# Patient Record
Sex: MALE | Race: BLACK OR AFRICAN AMERICAN | Employment: FULL TIME | URBAN - METROPOLITAN AREA
[De-identification: names, ages, dates, MRNs, and addresses within clinical notes are randomized per-mention and may not be internally consistent; named-entity substitution may affect disease eponyms.]

---

## 2018-02-19 ENCOUNTER — OFFICE VISIT (OUTPATIENT)
Dept: FAMILY MEDICINE CLINIC | Age: 43
End: 2018-02-19
Payer: COMMERCIAL

## 2018-02-19 VITALS
BODY MASS INDEX: 30.3 KG/M2 | SYSTOLIC BLOOD PRESSURE: 161 MMHG | DIASTOLIC BLOOD PRESSURE: 90 MMHG | WEIGHT: 256.6 LBS | HEART RATE: 71 BPM | HEIGHT: 77 IN

## 2018-02-19 DIAGNOSIS — I10 ESSENTIAL HYPERTENSION: Primary | ICD-10-CM

## 2018-02-19 DIAGNOSIS — G89.29 CHRONIC PAIN OF LEFT KNEE: ICD-10-CM

## 2018-02-19 DIAGNOSIS — Z72.0 TOBACCO ABUSE: ICD-10-CM

## 2018-02-19 DIAGNOSIS — Z13.220 SCREENING FOR LIPID DISORDERS: ICD-10-CM

## 2018-02-19 DIAGNOSIS — Z76.89 ENCOUNTER TO ESTABLISH CARE: ICD-10-CM

## 2018-02-19 DIAGNOSIS — Z11.4 SCREENING FOR HIV (HUMAN IMMUNODEFICIENCY VIRUS): ICD-10-CM

## 2018-02-19 DIAGNOSIS — M25.562 CHRONIC PAIN OF LEFT KNEE: ICD-10-CM

## 2018-02-19 DIAGNOSIS — Z13.1 SCREENING FOR DIABETES MELLITUS: ICD-10-CM

## 2018-02-19 PROCEDURE — 99204 OFFICE O/P NEW MOD 45 MIN: CPT | Performed by: NURSE PRACTITIONER

## 2018-02-19 RX ORDER — AMLODIPINE BESYLATE 5 MG/1
5 TABLET ORAL DAILY
Qty: 30 TABLET | Refills: 3 | Status: SHIPPED | OUTPATIENT
Start: 2018-02-19 | End: 2019-04-08

## 2018-02-19 RX ORDER — HYDROCHLOROTHIAZIDE 25 MG/1
12.5 TABLET ORAL DAILY
Qty: 30 TABLET | Refills: 3 | Status: SHIPPED | OUTPATIENT
Start: 2018-02-19 | End: 2019-04-08

## 2018-02-19 ASSESSMENT — PATIENT HEALTH QUESTIONNAIRE - PHQ9
2. FEELING DOWN, DEPRESSED OR HOPELESS: 2
4. FEELING TIRED OR HAVING LITTLE ENERGY: 1
SUM OF ALL RESPONSES TO PHQ9 QUESTIONS 1 & 2: 4
6. FEELING BAD ABOUT YOURSELF - OR THAT YOU ARE A FAILURE OR HAVE LET YOURSELF OR YOUR FAMILY DOWN: 0
1. LITTLE INTEREST OR PLEASURE IN DOING THINGS: 2
10. IF YOU CHECKED OFF ANY PROBLEMS, HOW DIFFICULT HAVE THESE PROBLEMS MADE IT FOR YOU TO DO YOUR WORK, TAKE CARE OF THINGS AT HOME, OR GET ALONG WITH OTHER PEOPLE: 0
9. THOUGHTS THAT YOU WOULD BE BETTER OFF DEAD, OR OF HURTING YOURSELF: 0
8. MOVING OR SPEAKING SO SLOWLY THAT OTHER PEOPLE COULD HAVE NOTICED. OR THE OPPOSITE, BEING SO FIGETY OR RESTLESS THAT YOU HAVE BEEN MOVING AROUND A LOT MORE THAN USUAL: 0
SUM OF ALL RESPONSES TO PHQ QUESTIONS 1-9: 6
5. POOR APPETITE OR OVEREATING: 0
3. TROUBLE FALLING OR STAYING ASLEEP: 1
7. TROUBLE CONCENTRATING ON THINGS, SUCH AS READING THE NEWSPAPER OR WATCHING TELEVISION: 0

## 2018-02-19 ASSESSMENT — ENCOUNTER SYMPTOMS
SHORTNESS OF BREATH: 0
CONSTIPATION: 0
DIARRHEA: 0
CHEST TIGHTNESS: 0
TROUBLE SWALLOWING: 0
NAUSEA: 0
VOMITING: 0

## 2018-02-19 NOTE — PROGRESS NOTES
(left knee pain). Neurological: Positive for dizziness (intermittent). Negative for syncope, light-headedness and headaches. Objective:   BP (!) 161/90 (Site: Left Arm, Position: Sitting, Cuff Size: Large Adult)   Pulse 71   Ht 6' 5\" (1.956 m)   Wt 256 lb 9.6 oz (116.4 kg)   BMI 30.43 kg/m²     Physical Exam   Constitutional: He is oriented to person, place, and time. He appears well-developed and well-nourished. No distress. HENT:   Head: Normocephalic. Eyes: Conjunctivae are normal.   Neck: Normal range of motion. Cardiovascular: Normal rate, regular rhythm and normal heart sounds. Pulmonary/Chest: Effort normal and breath sounds normal.   Abdominal: Soft. Bowel sounds are normal.   Musculoskeletal:        Left knee: He exhibits deformity (with movement). He exhibits normal range of motion, no swelling, no effusion, no LCL laxity, normal patellar mobility, no bony tenderness and no MCL laxity. No tenderness found. No medial joint line, no lateral joint line and no patellar tendon tenderness noted. Neurological: He is alert and oriented to person, place, and time. No cranial nerve deficit. GCS eye subscore is 4. GCS verbal subscore is 5. GCS motor subscore is 6. Skin: Skin is warm, dry and intact. He is not diaphoretic. Psychiatric: He has a normal mood and affect. His speech is normal.   Vitals reviewed. Assessment & Plan:      1. Essential hypertension  Discussed importance of treatment for HTN   Will initiate treatment with 12.5 HCTZ and amlodipine 5mg with room to titrate dosage up. - hydrochlorothiazide (HYDRODIURIL) 25 MG tablet; Take 0.5 tablets by mouth daily  Dispense: 30 tablet; Refill: 3  - amLODIPine (NORVASC) 5 MG tablet; Take 1 tablet by mouth daily  Dispense: 30 tablet; Refill: 3  - Comprehensive Metabolic Panel; Future    2. Chronic pain of left knee  Will obtain imaging  Declines referral to orthopedic surgery  - XR KNEE LEFT (3 VIEWS); Future    3.  Encounter to establish care  - I generally recommend that people of all ages try to get 150 minutes of physical activity per week and it doesnt matter how this totals up, in other words 30 minutes 5 days per week is as good as 50 minutes 3 days a week and so on. The level of activity should be such that it is able to get your heart rate up to 100 or more, for example a brisk walk should achieve this rate. - In terms of diet, I generally recommend trying to avoid processed foods wherever possible (anything that comes in a can or a box) which can be achieved by sticking to the outside walls of the grocery store where generally you will find fresh fruits/vegetables, meats, dairy, and frozen foods.    - Try to avoid starches in the diet where possible and minimize bread, rice, potatoes, and pasta in the diet. Specifically try to avoid gluten, which even in people that dont have a clara allergy, causes havoc in the small intestine and alters absorption of nutrients which can in turn lead to obesity.   - Try to achieve a regular sleep schedule, waking and laying down at the same time each night. Most people need 7 hours per night plus or minus 2 hours. You will know that youre getting enough because you will wake feeling refreshed and not need to sleep in to catch up on weekends. - Make sure that you dont neglect your skin, I recommend an SPF 30 or higher sun screen any time that you plan to be in the sun for more than 20 minutes, even in the winter or on cloudy days (keep in mind that UV light penetrates clouds and can cause burns even on cloudy days). - Finally, make sure that you always have something to look forward to whether this is a vacation, a special event, or just a weekend off work. Having something to look forward to helps to maintain positive focus and is good for mental health.      4. Screening for diabetes mellitus  Health maintanence  - Hemoglobin A1C; Future  - Comprehensive Metabolic Panel;

## 2018-02-19 NOTE — PATIENT INSTRUCTIONS
link.  Current as of: March 20, 2017  Content Version: 11.5  © 8863-1607 [employer]. Care instructions adapted under license by [facility] ([facility]). If you have questions about a medical condition or this instruction, always ask your healthcare professional. [address] any warranty or liability for your use of this information. High Blood Pressure: Care Instructions  Your Care Instructions    If your blood pressure is usually above 140/90, you have high blood pressure, or hypertension. That means the top number is 140 or higher or the bottom number is 90 or higher, or both. Despite what a lot of people think, high blood pressure usually doesn't cause headaches or make you feel dizzy or lightheaded. It usually has no symptoms. But it does increase your risk for heart attack, stroke, and kidney or eye damage. The higher your blood pressure, the more your risk increases. Your doctor will give you a goal for your blood pressure. Your goal will be based on your health and your age. An example of a goal is to keep your blood pressure below 140/90. Lifestyle changes, such as eating healthy and being active, are always important to help lower blood pressure. You might also take medicine to reach your blood pressure goal.  Follow-up care is a key part of your treatment and safety. Be sure to make and go to all appointments, and call your doctor if you are having problems. It's also a good idea to know your test results and keep a list of the medicines you take. How can you care for yourself at home? Medical treatment  · If you stop taking your medicine, your blood pressure will go back up. You may take one or more types of medicine to lower your blood pressure. Be safe with medicines. Take your medicine exactly as prescribed. Call your doctor if you think you are having a problem with your medicine. · Talk to your doctor before you start taking aspirin every day.  Aspirin can help certain people lower their risk of a heart attack or stroke. But taking aspirin isn't right for everyone, because it can cause serious bleeding. · See your doctor regularly. You may need to see the doctor more often at first or until your blood pressure comes down. · If you are taking blood pressure medicine, talk to your doctor before you take decongestants or anti-inflammatory medicine, such as ibuprofen. Some of these medicines can raise blood pressure. · Learn how to check your blood pressure at home. Lifestyle changes  · Stay at a healthy weight. This is especially important if you put on weight around the waist. Losing even 10 pounds can help you lower your blood pressure. · If your doctor recommends it, get more exercise. Walking is a good choice. Bit by bit, increase the amount you walk every day. Try for at least 30 minutes on most days of the week. You also may want to swim, bike, or do other activities. · Avoid or limit alcohol. Talk to your doctor about whether you can drink any alcohol. · Try to limit how much sodium you eat to less than 2,300 milligrams (mg) a day. Your doctor may ask you to try to eat less than 1,500 mg a day. · Eat plenty of fruits (such as bananas and oranges), vegetables, legumes, whole grains, and low-fat dairy products. · Lower the amount of saturated fat in your diet. Saturated fat is found in animal products such as milk, cheese, and meat. Limiting these foods may help you lose weight and also lower your risk for heart disease. · Do not smoke. Smoking increases your risk for heart attack and stroke. If you need help quitting, talk to your doctor about stop-smoking programs and medicines. These can increase your chances of quitting for good. When should you call for help? Call 911 anytime you think you may need emergency care. This may mean having symptoms that suggest that your blood pressure is causing a serious heart or blood vessel problem.  Your blood pressure may be over 180/110. ? For example, call 911 if:  ? · You have symptoms of a heart attack. These may include:  ¨ Chest pain or pressure, or a strange feeling in the chest.  ¨ Sweating. ¨ Shortness of breath. ¨ Nausea or vomiting. ¨ Pain, pressure, or a strange feeling in the back, neck, jaw, or upper belly or in one or both shoulders or arms. ¨ Lightheadedness or sudden weakness. ¨ A fast or irregular heartbeat. ? · You have symptoms of a stroke. These may include:  ¨ Sudden numbness, tingling, weakness, or loss of movement in your face, arm, or leg, especially on only one side of your body. ¨ Sudden vision changes. ¨ Sudden trouble speaking. ¨ Sudden confusion or trouble understanding simple statements. ¨ Sudden problems with walking or balance. ¨ A sudden, severe headache that is different from past headaches. ? · You have severe back or belly pain. ?Do not wait until your blood pressure comes down on its own. Get help right away. ?Call your doctor now or seek immediate care if:  ? · Your blood pressure is much higher than normal (such as 180/110 or higher), but you don't have symptoms. ? · You think high blood pressure is causing symptoms, such as:  ¨ Severe headache. ¨ Blurry vision. ? Watch closely for changes in your health, and be sure to contact your doctor if:  ? · Your blood pressure measures 140/90 or higher at least 2 times. That means the top number is 140 or higher or the bottom number is 90 or higher, or both. ? · You think you may be having side effects from your blood pressure medicine. ? · Your blood pressure is usually normal, but it goes above normal at least 2 times. Where can you learn more? Go to [email or web address] [email or web address] org and sign in to your [employer] account. Enter C860 in the [employer] box to learn more about \"High Blood Pressure: Care Instructions. \"     If you do not have an account, please click on the \"Sign Up Now\" link.   Current as of: Brie 10, 2017  Content Version: 11.5  © 1893-7900 [employer], [employer]. Care instructions adapted under license by [facility] ([facility]). If you have questions about a medical condition or this instruction, always ask your healthcare professional. [address] any warranty or liability for your use of this information.     - I generally recommend that people of all ages try to get 150 minutes of physical activity per week and it doesnt matter how this totals up, in other words 30 minutes 5 days per week is as good as 50 minutes 3 days a week and so on. The level of activity should be such that it is able to get your heart rate up to 100 or more, for example a brisk walk should achieve this rate. - In terms of diet, I generally recommend trying to avoid processed foods wherever possible (anything that comes in a can or a box) which can be achieved by sticking to the outside walls of the grocery store where generally you will find fresh fruits/vegetables, meats, dairy, and frozen foods.    - Try to avoid starches in the diet where possible and minimize bread, rice, potatoes, and pasta in the diet. Specifically try to avoid gluten, which even in people that dont have a clara allergy, causes havoc in the small intestine and alters absorption of nutrients which can in turn lead to obesity.   - Try to achieve a regular sleep schedule, waking and laying down at the same time each night. Most people need 7 hours per night plus or minus 2 hours. You will know that youre getting enough because you will wake feeling refreshed and not need to sleep in to catch up on weekends. - Make sure that you dont neglect your skin, I recommend an SPF 30 or higher sun screen any time that you plan to be in the sun for more than 20 minutes, even in the winter or on cloudy days (keep in mind that UV light penetrates clouds and can cause burns even on cloudy days).    - Finally, make sure that you always have something to look forward to whether this is a vacation, a special event, or just a weekend off work. Having something to look forward to helps to maintain positive focus and is good for mental health. In order to reduce smoking try the following:  -Find a place for your cigarettes to [facility]" and make sure they always stay there and at the same time pick a separate place where you smoke them. The idea here is that you never want them to be easily accessible so that you actually have to get up and walk somewhere to get one if you want it.    -Try to keep something handy that you can keep in your mouth for instance a toothpick or better yet a bag of dum dum pops that you can keep in your mouth to take the place of the cigarette.    -third, before each cigarette drink about 6-8 ounces of water. There are two reasons to this, one is that dehydration causes nicotine cravings and the other is that because the average nicotine craving lasts about 2 minutes, in the time it takes to get the drink, you may not want the cigarette anymore.

## 2019-04-05 ENCOUNTER — HOSPITAL ENCOUNTER (EMERGENCY)
Age: 44
Discharge: HOME | End: 2019-04-06
Attending: EMERGENCY MEDICINE
Payer: COMMERCIAL

## 2019-04-05 DIAGNOSIS — M54.50 ACUTE RIGHT-SIDED LOW BACK PAIN WITHOUT SCIATICA: ICD-10-CM

## 2019-04-05 DIAGNOSIS — S39.012A STRAIN OF LUMBAR REGION, INITIAL ENCOUNTER: Primary | ICD-10-CM

## 2019-04-05 PROCEDURE — 99282 EMERGENCY DEPT VISIT SF MDM: CPT

## 2019-04-05 PROCEDURE — 6370000000 HC RX 637 (ALT 250 FOR IP): Performed by: EMERGENCY MEDICINE

## 2019-04-05 PROCEDURE — 6360000002 HC RX W HCPCS: Performed by: EMERGENCY MEDICINE

## 2019-04-05 RX ORDER — HYDROCODONE BITARTRATE AND ACETAMINOPHEN 5; 325 MG/1; MG/1
1 TABLET ORAL ONCE
Status: COMPLETED | OUTPATIENT
Start: 2019-04-06 | End: 2019-04-05

## 2019-04-05 RX ORDER — CYCLOBENZAPRINE HCL 10 MG
10 TABLET ORAL 3 TIMES DAILY PRN
COMMUNITY
End: 2019-04-29

## 2019-04-05 RX ORDER — IBUPROFEN 800 MG/1
800 TABLET ORAL EVERY 6 HOURS PRN
COMMUNITY
End: 2019-04-08

## 2019-04-05 RX ORDER — DEXAMETHASONE 4 MG/1
6 TABLET ORAL ONCE
Status: COMPLETED | OUTPATIENT
Start: 2019-04-06 | End: 2019-04-05

## 2019-04-05 RX ORDER — DIAZEPAM 5 MG/1
5 TABLET ORAL ONCE
Status: COMPLETED | OUTPATIENT
Start: 2019-04-06 | End: 2019-04-06

## 2019-04-05 RX ADMIN — HYDROCODONE BITARTRATE AND ACETAMINOPHEN 1 TABLET: 5; 325 TABLET ORAL at 23:59

## 2019-04-05 RX ADMIN — DEXAMETHASONE 6 MG: 4 TABLET ORAL at 23:59

## 2019-04-05 ASSESSMENT — PAIN SCALES - GENERAL
PAINLEVEL_OUTOF10: 10
PAINLEVEL_OUTOF10: 10

## 2019-04-06 VITALS
HEART RATE: 79 BPM | WEIGHT: 250 LBS | SYSTOLIC BLOOD PRESSURE: 159 MMHG | RESPIRATION RATE: 14 BRPM | OXYGEN SATURATION: 100 % | HEIGHT: 77 IN | BODY MASS INDEX: 29.52 KG/M2 | DIASTOLIC BLOOD PRESSURE: 99 MMHG | TEMPERATURE: 97.7 F

## 2019-04-06 PROCEDURE — 6370000000 HC RX 637 (ALT 250 FOR IP): Performed by: EMERGENCY MEDICINE

## 2019-04-06 RX ADMIN — DIAZEPAM 5 MG: 5 TABLET ORAL at 00:00

## 2019-04-06 ASSESSMENT — ENCOUNTER SYMPTOMS
CHEST TIGHTNESS: 0
EYE REDNESS: 0
ABDOMINAL PAIN: 0
EYE DISCHARGE: 0
SHORTNESS OF BREATH: 0
FACIAL SWELLING: 0
BACK PAIN: 1
ABDOMINAL DISTENTION: 0

## 2019-04-06 NOTE — ED PROVIDER NOTES
[facility] ED  eMERGENCY dEPARTMENT eNCOUnter      Pt Name: Frandy Rdz  MRN: [id number]  [place] 1975  Date of evaluation: 4/5/2019  Provider: Joe Gamboa       CHIEF COMPLAINT       Chief Complaint   Patient presents with    Back Pain         HISTORY OF PRESENT ILLNESS  (Location/Symptom, Timing/Onset, Context/Setting, Quality, Duration, Modifying Factors, Severity.)   Frandy Rdz is a 37 y.o. male who presents to the emergency department complaining of acute onset chronic back pain. The patient said over the last few weeks his low back pain has gotten worse. He went to work today and it seemed to aggravate it. His also having pain in his right upper thigh. The patient said is an aching pain when he walks and he has pain to the right lower back. He said it is like his normal back pain. He denies any paresthesias he denies any loss of bowel or bladder control. The patient did go to [facility] today he got Toradol and Norflex shot he said it did not help. Patient denies any trauma. States his pain is a 10 out of 10. The patient told me when he is sitting down the pain is improved the pain is aggravated when he gets up to walk. He denies any motor weakness. Nursing Notes werereviewed. REVIEW OF SYSTEMS    (2-9 systems for level 4, 10 or more for level 5)     Review of Systems   Constitutional: Negative for chills and fatigue. HENT: Negative for facial swelling. Eyes: Negative for discharge and redness. Respiratory: Negative for chest tightness and shortness of breath. Cardiovascular: Negative for chest pain and palpitations. Gastrointestinal: Negative for abdominal distention and abdominal pain. Genitourinary: Negative for dysuria and hematuria. Musculoskeletal: Positive for back pain. Negative for gait problem, joint swelling and neck pain. Skin: Negative for pallor and rash. Neurological: Negative for speech difficulty and headaches.    All other systems reviewed and are negative. Except as noted above the remainder of the review of systems was reviewed and negative. PAST MEDICAL HISTORY     Past Medical History:   Diagnosis Date    Hypertension          SURGICAL HISTORY     History reviewed. No pertinent surgical history. CURRENT MEDICATIONS       Previous Medications    AMLODIPINE (NORVASC) 5 MG TABLET    Take 1 tablet by mouth daily    CYCLOBENZAPRINE (FLEXERIL) 10 MG TABLET    Take 10 mg by mouth 3 times daily as needed for Muscle spasms    HYDROCHLOROTHIAZIDE (HYDRODIURIL) 25 MG TABLET    Take 0.5 tablets by mouth daily    IBUPROFEN (ADVIL;MOTRIN) 800 MG TABLET    Take 800 mg by mouth every 6 hours as needed for Pain       ALLERGIES     Patient has no known allergies. FAMILY HISTORY     History reviewed. No pertinent family history. SOCIAL HISTORY       Social History     Socioeconomic History    Marital status: Single     Spouse name: None    Number of children: None    Years of education: None    Highest education level: None   Occupational History    None   Social Needs    Financial resource strain: None    Food insecurity:     Worry: None     Inability: None    Transportation needs:     Medical: None     Non-medical: None   Tobacco Use    Smoking status: Heavy Tobacco Smoker    Smokeless tobacco: Never Used   Substance and Sexual Activity    Alcohol use:  Yes     Alcohol/week: 1.2 oz     Types: 2 Cans of beer per week     Comment: nightly    Drug use: No    Sexual activity: None   Lifestyle    Physical activity:     Days per week: None     Minutes per session: None    Stress: None   Relationships    Social connections:     Talks on phone: None     Gets together: None     Attends Holiness service: None     Active member of club or organization: None     Attends meetings of clubs or organizations: None     Relationship status: None    Intimate partner violence:     Fear of current or ex partner: None Emotionally abused: None     Physically abused: None     Forced sexual activity: None   Other Topics Concern    None   Social History Narrative    None         PHYSICAL EXAM    (up to 7 for level 4, 8 or more for level 5)      ED Triage Vitals [04/05/19 2328]   BP Temp Temp Source Pulse Resp SpO2 Height Weight   -- 97.7 °F (36.5 °C) Oral 79 14 100 % 6' 5\" (1.956 m) 250 lb (113.4 kg)       Physical Exam   Constitutional: He is oriented to person, place, and time. He appears well-nourished. HENT:   Head: Normocephalic. Eyes: Conjunctivae are normal.   Neck: Normal range of motion. Neck supple. Cardiovascular: Normal rate and regular rhythm. Pulmonary/Chest: Effort normal and breath sounds normal.   Musculoskeletal: Normal range of motion. He exhibits no edema. Lumbar back: He exhibits tenderness, pain and spasm. He exhibits no bony tenderness. Back:         Legs:  Neurological: He is alert and oriented to person, place, and time. He has normal strength. No sensory deficit. Skin: Skin is warm and dry. Nursing note and vitals reviewed. DIAGNOSTIC RESULTS     EKG: All EKG's are interpreted by the Emergency Department Physician who either signs or Co-signs this chart in the absence of a cardiologist.         RADIOLOGY:   Non-plain film images such as CT, Ultrasound and MRI are read by the radiologist. Plain radiographic images arevisualized and preliminarily interpreted by the emergency physician with the below findings:    No results found. Interpretation per the Radiologist below, if available at the time of this note:    No orders to display         ED BEDSIDE ULTRASOUND:   Performed by ED Physician - none    LABS:  Labs Reviewed - No data to display    All other labs were within normal range or not returned as of this dictation.     EMERGENCY DEPARTMENT COURSE and DIFFERENTIALDIAGNOSIS/MDM:   Vitals:    Vitals:    04/05/19 2328   Pulse: 79   Resp: 14   Temp: 97.7 °F (36.5 °C) TempSrc: Oral   SpO2: 100%   Weight: 113.4 kg (250 lb)   Height: 6' 5\" (1.956 m)            RE-EVALUATION:    The patient has some relief he was given Valium one Norco and Decadron. Patient already has a prescription for Flexeril and Motrin. He agrees with the plan is comfortable going home. He is told to return if any changes of his symptoms. The patient presents with low back pain without signs of spinal cord compression, cauda equina syndrome, infection, aneurysm or other serious etiology. The patient is neurologically intact. Given the extremely low risk of these diagnoses further testing and evaluation for these possibilities does not appear to be indicated at this time. The patient has been instructed to return if the symptoms worsen or change in any way. I have reviewed the disposition diagnosis with the patient and or their family/guardian. I have answered their questions and given discharge instructions. They voiced understanding of these instructions and did not have any further questions or complaints. CONSULTS:  None    PROCEDURES:  None    FINAL IMPRESSION      1. Strain of lumbar region, initial encounter    2. Acute right-sided low back pain without sciatica          DISPOSITION/PLAN   DISPOSITION Decision To Discharge 04/06/2019 12:44:29 AM      CONDITION ON DISPOSITION:  stable    PATIENT REFERRED TO:  KALE Burns - [place] [address]  [phone]    Schedule an appointment as soon as possible for a visit in 2 days        DISCHARGE MEDICATIONS:  [unfilled]    (Please note that portions of thisnote were completed with a voice recognition program.  Efforts were made to edit the dictations but occasionally words are mis-transcribed.)    Lorin Us D.O., F.A.CCLARICE.   Attending [address] [place] [address],[address], DO  04/06/19 0045

## 2019-04-08 ENCOUNTER — HOSPITAL ENCOUNTER (EMERGENCY)
Age: 44
Discharge: HOME | End: 2019-04-08
Attending: EMERGENCY MEDICINE
Payer: COMMERCIAL

## 2019-04-08 ENCOUNTER — HOSPITAL ENCOUNTER (OUTPATIENT)
Dept: GENERAL RADIOLOGY | Facility: CLINIC | Age: 44
Discharge: HOME | End: 2019-04-10
Payer: COMMERCIAL

## 2019-04-08 ENCOUNTER — HOSPITAL ENCOUNTER (OUTPATIENT)
Facility: CLINIC | Age: 44
Discharge: HOME | End: 2019-04-10
Payer: COMMERCIAL

## 2019-04-08 VITALS
DIASTOLIC BLOOD PRESSURE: 96 MMHG | BODY MASS INDEX: 29.52 KG/M2 | TEMPERATURE: 98.6 F | SYSTOLIC BLOOD PRESSURE: 158 MMHG | HEIGHT: 77 IN | WEIGHT: 250 LBS | OXYGEN SATURATION: 97 % | RESPIRATION RATE: 16 BRPM | HEART RATE: 73 BPM

## 2019-04-08 DIAGNOSIS — M54.31 SCIATICA OF RIGHT SIDE: Primary | ICD-10-CM

## 2019-04-08 DIAGNOSIS — M54.6 ACUTE RIGHT-SIDED THORACIC BACK PAIN: ICD-10-CM

## 2019-04-08 PROCEDURE — 6360000002 HC RX W HCPCS: Performed by: EMERGENCY MEDICINE

## 2019-04-08 PROCEDURE — 6370000000 HC RX 637 (ALT 250 FOR IP): Performed by: EMERGENCY MEDICINE

## 2019-04-08 PROCEDURE — 99283 EMERGENCY DEPT VISIT LOW MDM: CPT

## 2019-04-08 PROCEDURE — 72072 X-RAY EXAM THORAC SPINE 3VWS: CPT

## 2019-04-08 RX ORDER — PREDNISONE 10 MG/1
TABLET ORAL
Qty: 20 TABLET | Refills: 0 | Status: SHIPPED | OUTPATIENT
Start: 2019-04-08 | End: 2019-04-08

## 2019-04-08 RX ORDER — IBUPROFEN 800 MG/1
800 TABLET ORAL EVERY 8 HOURS PRN
Qty: 30 TABLET | Refills: 0 | Status: SHIPPED | OUTPATIENT
Start: 2019-04-08 | End: 2021-02-10

## 2019-04-08 RX ORDER — ORPHENADRINE CITRATE 100 MG/1
100 TABLET, EXTENDED RELEASE ORAL 2 TIMES DAILY
Qty: 20 TABLET | Refills: 0 | Status: SHIPPED | OUTPATIENT
Start: 2019-04-08 | End: 2019-04-08

## 2019-04-08 RX ORDER — METHYLPREDNISOLONE SODIUM SUCCINATE 125 MG/2ML
125 INJECTION, POWDER, LYOPHILIZED, FOR SOLUTION INTRAMUSCULAR; INTRAVENOUS ONCE
Status: COMPLETED | OUTPATIENT
Start: 2019-04-08 | End: 2019-04-08

## 2019-04-08 RX ORDER — KETOROLAC TROMETHAMINE 30 MG/ML
60 INJECTION, SOLUTION INTRAMUSCULAR; INTRAVENOUS ONCE
Status: COMPLETED | OUTPATIENT
Start: 2019-04-08 | End: 2019-04-08

## 2019-04-08 RX ORDER — DIAZEPAM 5 MG/1
5 TABLET ORAL ONCE
Status: COMPLETED | OUTPATIENT
Start: 2019-04-08 | End: 2019-04-08

## 2019-04-08 RX ADMIN — DIAZEPAM 5 MG: 5 TABLET ORAL at 03:30

## 2019-04-08 RX ADMIN — METHYLPREDNISOLONE SODIUM SUCCINATE 125 MG: 125 INJECTION, POWDER, FOR SOLUTION INTRAMUSCULAR; INTRAVENOUS at 03:31

## 2019-04-08 RX ADMIN — KETOROLAC TROMETHAMINE 60 MG: 30 INJECTION, SOLUTION INTRAMUSCULAR at 03:32

## 2019-04-08 ASSESSMENT — PAIN SCALES - GENERAL: PAINLEVEL_OUTOF10: 10

## 2019-04-08 ASSESSMENT — ENCOUNTER SYMPTOMS
DIARRHEA: 0
SHORTNESS OF BREATH: 0
BACK PAIN: 1
SORE THROAT: 0
NAUSEA: 0
VOMITING: 0
ABDOMINAL PAIN: 0

## 2019-04-08 NOTE — ED PROVIDER NOTES
[facility]  [address] [place] [address]  Phone: [phone]  Fax: [phone]    eMERGENCY dEPARTMENT eNCOUnter          Pt Name: Russ Quinonez  MRN: [id number]  [place] 1975  Date of evaluation: 4/8/2019      CHIEF COMPLAINT       Chief Complaint   Patient presents with    Back Pain    Leg Pain     right       HISTORY OF PRESENT ILLNESS              Russ Quinonez is a 37 y.o. male who presents with recurrent right low back pain radiating to his leg. History of similar pain in the past.  States it flared up on him a few days ago. States he is going to call his PCP first thing this morning. Medications did help when he was here a few days ago. Nothing otherwise makes it better or worse. Denies any neurologic symptoms such as numbness/weakness/paresthesias. No bowel or bladder retention/incontinence. Denies any recent trauma. States he does do a fair amount of heavy lifting daily. Denies any fevers. No IV drug use. Denies any other symptoms or concerns. REVIEW OF SYSTEMS         Review of Systems   Constitutional: Negative for chills and fever. HENT: Negative for sore throat. Respiratory: Negative for shortness of breath. Cardiovascular: Negative for chest pain. Gastrointestinal: Negative for abdominal pain, diarrhea, nausea and vomiting. Musculoskeletal: Positive for back pain. Negative for neck pain. Skin: Negative for rash. Neurological: Negative for weakness and numbness. All other systems reviewed and are negative. PAST MEDICAL HISTORY    has a past medical history of Hypertension. SURGICAL HISTORY      has no past surgical history on file.     CURRENT MEDICATIONS       Discharge Medication List as of 4/8/2019  3:31 AM      CONTINUE these medications which have NOT CHANGED    Details   cyclobenzaprine (FLEXERIL) 10 MG tablet Take 10 mg by mouth 3 times daily as needed for Muscle spasmsHistorical Med Take 1 tablet by mouth every 8 hours as needed for Pain     Dispense:  30 tablet     Refill:  0        Vitals:    Vitals:    04/08/19 0317 04/08/19 0322 04/08/19 0330   BP:  (!) 158/96    Pulse: 73     Resp:   16   Temp: 98.6 °F (37 °C)     TempSrc: Oral     SpO2: 97%     Weight: 250 lb (113.4 kg)     Height: 6' 5\" (1.956 m)       -------------------------  BP: (!) 158/96, Temp: 98.6 °F (37 °C), Pulse: 73, Resp: 16      Re-evaluation Notes    Patient is feeling better on reevaluation. No acute changes. Remains neurologically intact. Appropriate for discharge and outpatient follow-up. I have reviewed the disposition diagnosis with the patient and or their family/guardian. I have answered their questions and given discharge instructions. They voiced understanding of these instructions and did not have any further questions or complaints. CONSULTS:    None    CRITICAL CARE:     None    PROCEDURES:    None    FINAL IMPRESSION      1.  Sciatica of right side          DISPOSITION/PLAN   DISPOSITION Decision To Discharge 04/08/2019 03:36:01 AM      Condition on Disposition    Improved    PATIENT REFERRED TO:  KALE Shane [address] [place]  [address]  [phone]    Schedule an appointment as soon as possible for a visit today      [facility]  [phone]    Schedule an appointment as soon as possible for a visit in 2 days        DISCHARGE MEDICATIONS:  Discharge Medication List as of 4/8/2019  3:31 AM      START taking these medications    Details   predniSONE (DELTASONE) 10 MG tablet Take 4 tablets by mouth once daily for 5 days, Disp-20 tablet, R-0Print      orphenadrine (NORFLEX) 100 MG extended release tablet Take 1 tablet by mouth 2 times daily for 10 days, Disp-20 tablet, R-0Print             (Please note that portions of this note were completed with a voice recognition program.  Efforts were made to edit the dictations but occasionally words are mis-transcribed.)    Mauricio Manuel,   Attending Emergency Physician        Mauricio Manuel,   04/08/19 2137

## 2019-04-09 ENCOUNTER — HOSPITAL ENCOUNTER (OUTPATIENT)
Dept: MRI IMAGING | Age: 44
Discharge: HOME | End: 2019-04-11
Payer: COMMERCIAL

## 2019-04-09 DIAGNOSIS — M54.6 ACUTE RIGHT-SIDED THORACIC BACK PAIN: ICD-10-CM

## 2019-04-09 DIAGNOSIS — R20.2 PARESTHESIA: ICD-10-CM

## 2019-04-09 DIAGNOSIS — R22.2 LOCALIZED SWELLING OF BACK: ICD-10-CM

## 2019-04-09 DIAGNOSIS — R29.898 WEAKNESS OF RIGHT LOWER EXTREMITY: ICD-10-CM

## 2019-04-11 ENCOUNTER — HOSPITAL ENCOUNTER (OUTPATIENT)
Age: 44
Discharge: HOME | End: 2019-04-13
Payer: COMMERCIAL

## 2019-04-11 ENCOUNTER — OFFICE VISIT (OUTPATIENT)
Dept: ORTHOPEDIC SURGERY | Age: 44
End: 2019-04-11
Payer: COMMERCIAL

## 2019-04-11 ENCOUNTER — HOSPITAL ENCOUNTER (OUTPATIENT)
Dept: GENERAL RADIOLOGY | Age: 44
Discharge: HOME | End: 2019-04-13
Payer: COMMERCIAL

## 2019-04-11 DIAGNOSIS — M25.551 RIGHT HIP PAIN: ICD-10-CM

## 2019-04-11 DIAGNOSIS — M54.10 RADICULAR PAIN OF RIGHT LOWER EXTREMITY: ICD-10-CM

## 2019-04-11 DIAGNOSIS — M54.5 ACUTE LOW BACK PAIN, UNSPECIFIED BACK PAIN LATERALITY, WITH SCIATICA PRESENCE UNSPECIFIED: Primary | ICD-10-CM

## 2019-04-11 DIAGNOSIS — M54.5 ACUTE LOW BACK PAIN, UNSPECIFIED BACK PAIN LATERALITY, WITH SCIATICA PRESENCE UNSPECIFIED: ICD-10-CM

## 2019-04-11 PROCEDURE — 99203 OFFICE O/P NEW LOW 30 MIN: CPT | Performed by: ORTHOPAEDIC SURGERY

## 2019-04-11 PROCEDURE — 72100 X-RAY EXAM L-S SPINE 2/3 VWS: CPT

## 2019-04-11 ASSESSMENT — ENCOUNTER SYMPTOMS
CONSTIPATION: 1
BACK PAIN: 1

## 2019-04-11 NOTE — PROGRESS NOTES
firmness    Examination right hip reveal some groin pain with hip range of motion    Patient reports symptoms of constipation    Diagnostic x-rays AP and lateral lumbar spine patient with postural list to the left otherwise normal  Assessment:      Encounter Diagnoses   Name Primary?     Acute low back pain, unspecified back pain laterality, with sciatica presence unspecified Yes    Radicular pain of right lower extremity     Right hip pain      Patient was unable to tolerate standard MRI      Plan:      MRI with sedation at [facility]    Refill ibuprofen and muscle relaxer        Paco Paula MD

## 2019-04-12 ENCOUNTER — HOSPITAL ENCOUNTER (EMERGENCY)
Age: 44
Discharge: HOME | End: 2019-04-12
Attending: EMERGENCY MEDICINE
Payer: COMMERCIAL

## 2019-04-12 ENCOUNTER — APPOINTMENT (OUTPATIENT)
Dept: GENERAL RADIOLOGY | Age: 44
End: 2019-04-12
Payer: COMMERCIAL

## 2019-04-12 VITALS
HEIGHT: 77 IN | WEIGHT: 250 LBS | SYSTOLIC BLOOD PRESSURE: 156 MMHG | TEMPERATURE: 98.2 F | OXYGEN SATURATION: 96 % | BODY MASS INDEX: 29.52 KG/M2 | DIASTOLIC BLOOD PRESSURE: 88 MMHG | RESPIRATION RATE: 18 BRPM | HEART RATE: 103 BPM

## 2019-04-12 DIAGNOSIS — M79.89 LEG SWELLING: Primary | ICD-10-CM

## 2019-04-12 DIAGNOSIS — M54.41 ACUTE RIGHT-SIDED LOW BACK PAIN WITH RIGHT-SIDED SCIATICA: ICD-10-CM

## 2019-04-12 LAB
ABSOLUTE EOS #: 0.2 K/UL (ref 0–0.4)
ABSOLUTE IMMATURE GRANULOCYTE: ABNORMAL K/UL (ref 0–0.3)
ABSOLUTE LYMPH #: 3 K/UL (ref 1–4.8)
ABSOLUTE MONO #: 0.7 K/UL (ref 0.1–1.3)
ALBUMIN SERPL-MCNC: 4.6 G/DL (ref 3.5–5.2)
ALBUMIN/GLOBULIN RATIO: ABNORMAL (ref 1–2.5)
ALP BLD-CCNC: 65 U/L (ref 40–129)
ALT SERPL-CCNC: 46 U/L (ref 5–41)
ANION GAP SERPL CALCULATED.3IONS-SCNC: 12 MMOL/L (ref 9–17)
AST SERPL-CCNC: 24 U/L
BASOPHILS # BLD: 1 % (ref 0–2)
BASOPHILS ABSOLUTE: 0.1 K/UL (ref 0–0.2)
BILIRUB SERPL-MCNC: 0.3 MG/DL (ref 0.3–1.2)
BUN BLDV-MCNC: 14 MG/DL (ref 6–20)
BUN/CREAT BLD: ABNORMAL (ref 9–20)
CALCIUM SERPL-MCNC: 9.7 MG/DL (ref 8.6–10.4)
CHLORIDE BLD-SCNC: 101 MMOL/L (ref 98–107)
CO2: 26 MMOL/L (ref 20–31)
CREAT SERPL-MCNC: 1.01 MG/DL (ref 0.7–1.2)
DIFFERENTIAL TYPE: ABNORMAL
EOSINOPHILS RELATIVE PERCENT: 2 % (ref 0–4)
GFR AFRICAN AMERICAN: >60 ML/MIN
GFR NON-AFRICAN AMERICAN: >60 ML/MIN
GFR SERPL CREATININE-BSD FRML MDRD: ABNORMAL ML/MIN/{1.73_M2}
GFR SERPL CREATININE-BSD FRML MDRD: ABNORMAL ML/MIN/{1.73_M2}
GLUCOSE BLD-MCNC: 93 MG/DL (ref 70–99)
HCT VFR BLD CALC: 43.8 % (ref 41–53)
HEMOGLOBIN: 15.1 G/DL (ref 13.5–17.5)
IMMATURE GRANULOCYTES: ABNORMAL %
LYMPHOCYTES # BLD: 35 % (ref 24–44)
MCH RBC QN AUTO: 33.7 PG (ref 26–34)
MCHC RBC AUTO-ENTMCNC: 34.5 G/DL (ref 31–37)
MCV RBC AUTO: 97.8 FL (ref 80–100)
MONOCYTES # BLD: 8 % (ref 1–7)
NRBC AUTOMATED: ABNORMAL PER 100 WBC
PDW BLD-RTO: 13.5 % (ref 11.5–14.9)
PLATELET # BLD: 215 K/UL (ref 150–450)
PLATELET ESTIMATE: ABNORMAL
PMV BLD AUTO: 9.5 FL (ref 6–12)
POTASSIUM SERPL-SCNC: 3.9 MMOL/L (ref 3.7–5.3)
RBC # BLD: 4.48 M/UL (ref 4.5–5.9)
RBC # BLD: ABNORMAL 10*6/UL
SEG NEUTROPHILS: 54 % (ref 36–66)
SEGMENTED NEUTROPHILS ABSOLUTE COUNT: 4.7 K/UL (ref 1.3–9.1)
SODIUM BLD-SCNC: 139 MMOL/L (ref 135–144)
TOTAL PROTEIN: 8.2 G/DL (ref 6.4–8.3)
WBC # BLD: 8.7 K/UL (ref 3.5–11)
WBC # BLD: ABNORMAL 10*3/UL

## 2019-04-12 PROCEDURE — 99284 EMERGENCY DEPT VISIT MOD MDM: CPT

## 2019-04-12 PROCEDURE — 6360000002 HC RX W HCPCS: Performed by: EMERGENCY MEDICINE

## 2019-04-12 PROCEDURE — 36415 COLL VENOUS BLD VENIPUNCTURE: CPT

## 2019-04-12 PROCEDURE — 93971 EXTREMITY STUDY: CPT

## 2019-04-12 PROCEDURE — 80053 COMPREHEN METABOLIC PANEL: CPT

## 2019-04-12 PROCEDURE — 85025 COMPLETE CBC W/AUTO DIFF WBC: CPT

## 2019-04-12 PROCEDURE — 96372 THER/PROPH/DIAG INJ SC/IM: CPT

## 2019-04-12 PROCEDURE — 74022 RADEX COMPL AQT ABD SERIES: CPT

## 2019-04-12 RX ORDER — MORPHINE SULFATE 10 MG/ML
6 INJECTION, SOLUTION INTRAMUSCULAR; INTRAVENOUS ONCE
Status: COMPLETED | OUTPATIENT
Start: 2019-04-12 | End: 2019-04-12

## 2019-04-12 RX ADMIN — MORPHINE SULFATE 6 MG: 10 INJECTION INTRAVENOUS at 15:16

## 2019-04-12 ASSESSMENT — ENCOUNTER SYMPTOMS
NAUSEA: 0
DIARRHEA: 1
BACK PAIN: 1
COUGH: 0
VOMITING: 0
SHORTNESS OF BREATH: 0

## 2019-04-12 ASSESSMENT — PAIN DESCRIPTION - PAIN TYPE: TYPE: ACUTE PAIN

## 2019-04-12 ASSESSMENT — PAIN DESCRIPTION - LOCATION: LOCATION: BACK;LEG

## 2019-04-12 ASSESSMENT — PAIN SCALES - GENERAL: PAINLEVEL_OUTOF10: 10

## 2019-04-12 ASSESSMENT — PAIN DESCRIPTION - ORIENTATION: ORIENTATION: RIGHT

## 2019-04-12 NOTE — ED NOTES
Pt given instructions for follow-up and discharge. Pt verbalizes understanding. Pt is A&O x4, PWD, eupneic, and ambulatory with steady, even gait upon discharge.       Mercedes Sharma RN  04/12/19 6186

## 2019-04-12 NOTE — ED PROVIDER NOTES
[address] ED  eMERGENCY dEPARTMENT eNCOUnter      Pt Name: Candace Sewell  MRN: [id number]  [place] 1975  Date of evaluation: 4/12/19      CHIEF COMPLAINT       Chief Complaint   Patient presents with    Leg Swelling    Back Pain     HISTORY OF PRESENT ILLNESS   HPI 37 y.o. male presents with complaints of foot swelling and back pain and diarrhea. The patient states that for the last few weeks he has been having right-sided low back pain radiating down to his leg. He is followed with orthopedic surgery, the pain is severe in intensity, worsened with movements. He's been taking Norco Flexeril and Aleve. The patient had an x-ray of his back yesterday that showed no evidence of fracture or lytic bone lesions. His orthopedic surgeon has ordered him an MRI to be performed under anesthesia. He is had some mild relief with his pain medications. He is also taking stool softeners with the Norco and he's been having some watery diarrhea. The patient presents to the emergency department today because he's noted some swelling in his bilateral feet more so on the right. No leg swelling. Says that he has some cramping in his right leg muscles, but this is been going on for a few weeks. REVIEW OF SYSTEMS     Review of Systems   Constitutional: Negative for chills and fever. HENT: Negative for congestion. Eyes: Negative for visual disturbance. Respiratory: Negative for cough and shortness of breath. Cardiovascular: Positive for leg swelling. Negative for chest pain. Gastrointestinal: Positive for diarrhea. Negative for nausea and vomiting. Musculoskeletal: Positive for arthralgias and back pain. Skin: Negative for rash. Neurological: Positive for numbness. Hematological: Does not bruise/bleed easily. PAST MEDICAL HISTORY     Past Medical History:   Diagnosis Date    Hypertension        SURGICAL HISTORY     History reviewed. No pertinent surgical history.     CURRENT MEDICATIONS Previous Medications    CYCLOBENZAPRINE (FLEXERIL) 10 MG TABLET    Take 10 mg by mouth 3 times daily as needed for Muscle spasms    HYDROCODONE-ACETAMINOPHEN (NORCO) 5-325 MG PER TABLET    Take 1-2 tablets by mouth every 8 hours as needed for Pain for up to 7 days. IBUPROFEN (ADVIL;MOTRIN) 800 MG TABLET    Take 1 tablet by mouth every 8 hours as needed for Pain       ALLERGIES     has No Known Allergies. FAMILY HISTORY     indicated that his mother is . He indicated that his father is . SOCIAL HISTORY      reports that he has been smoking. He has never used smokeless tobacco. He reports that he drinks about 1.2 oz of alcohol per week. He reports that he does not use drugs. PHYSICAL EXAM     INITIAL VITALS: BP (!) 156/88   Pulse 103   Temp 98.2 °F (36.8 °C) (Oral)   Ht 6' 5\" (1.956 m)   Wt 250 lb (113.4 kg)   SpO2 96%   BMI 29.65 kg/m²   Gen.: NAD   Head: Normocephalic, atraumatic  Eye: Pupils equal round reactive to light, no conjunctivitis  Heart: Mild initial tachycardia, but on my exam his heart rate is 96 measured apically. Regular rhythm no murmurs  Lungs: Clear to auscultation bilaterally, no respiratory distress  Chest wall: No crepitus, no tenderness palpation  Abdomen: Soft, nontender, nondistended, with no peritoneal signs  MSK: No CVA tenderness. Neurologic: Patient is alert and oriented x3, motor and sensation is intact in all 4 extremities  Lower extremity strength bilaterally:   Hip Flexor / iliopsoas (L2): 5/5  Knee Extensors / quadriceps (L3): 5/5  Ankle dorsiflexors / tibialis anterior (L4): 5/5  Long toe extensors  / EHL (L5): 5/5  Extremities: He has bilateral ankle edema, worse on the right. No warmth. DP pulses are intact bilaterally. There is no calf tenderness to palpation bilaterally, no tenderness over the femoral venous system. MEDICAL DECISION MAKING:     MDM  This is a 75-year-old male presenting with right foot swelling.   Will rule out any DVT. We will check his renal function and liver function tests. Wife is concerned about this diarrhea, his abdomen is nonsurgical but we'll get a KUB. Hospital Course:  Doppler shows no sign of a DVT. Xray shows a non-obstructive bowel gas pattern. Laboratory studies were reviewed and there is no evidence of any renal failure, no hypoalbuminemia, no evidence of any liver failure. The patient clinically has no sign of congestive heart failure. Etiology of the edema is unclear at this time. D/w pt results, treatment plan, warning precautions for prompt ED return and importance of close OP FU. DIAGNOSTIC RESULTS     RADIOLOGY:All plain film, CT, MRI, and formal ultrasound images (except ED bedside ultrasound) are read by the radiologist and the images and interpretations are directly viewed by the emergency physician. XR Acute Abd Series Chest 1 VW   Final Result   No acute cardiopulmonary process. Nonobstructive bowel gas pattern. VL DUP LOWER EXTREMITY VENOUS RIGHT    (Results Pending)       LABS: All lab results were reviewed by myself, and all abnormals are listed below. Labs Reviewed   CBC WITH AUTO DIFFERENTIAL - Abnormal; Notable for the following components:       Result Value    RBC 4.48 (*)     Monocytes 8 (*)     All other components within normal limits   COMPREHENSIVE METABOLIC PANEL - Abnormal; Notable for the following components:    ALT 46 (*)     All other components within normal limits       EMERGENCY DEPARTMENT COURSE:   Vitals:    Vitals:    04/12/19 1322   BP: (!) 156/88   Pulse: 103   Temp: 98.2 °F (36.8 °C)   TempSrc: Oral   SpO2: 96%   Weight: 250 lb (113.4 kg)   Height: 6' 5\" (1.956 m)       The patient was given the following medications while in the emergency department:  No orders of the defined types were placed in this encounter. -------------------------  CRITICAL CARE:   CONSULTS: None  PROCEDURES: Procedures     FINAL IMPRESSION      1.  Leg

## 2019-04-18 PROBLEM — I87.2 EDEMA OF BOTH LOWER LEGS DUE TO PERIPHERAL VENOUS INSUFFICIENCY: Status: ACTIVE | Noted: 2019-04-18

## 2019-04-18 PROBLEM — R60.0 EDEMA OF BOTH LOWER LEGS DUE TO PERIPHERAL VENOUS INSUFFICIENCY: Status: ACTIVE | Noted: 2019-04-18

## 2019-04-19 ENCOUNTER — ANESTHESIA (OUTPATIENT)
Dept: MRI IMAGING | Age: 44
End: 2019-04-19

## 2019-04-19 ENCOUNTER — ANESTHESIA EVENT (OUTPATIENT)
Dept: MRI IMAGING | Age: 44
End: 2019-04-19

## 2019-04-19 ENCOUNTER — HOSPITAL ENCOUNTER (OUTPATIENT)
Dept: MRI IMAGING | Age: 44
Discharge: HOME | End: 2019-04-21
Payer: COMMERCIAL

## 2019-04-19 VITALS
SYSTOLIC BLOOD PRESSURE: 140 MMHG | DIASTOLIC BLOOD PRESSURE: 83 MMHG | OXYGEN SATURATION: 98 % | RESPIRATION RATE: 15 BRPM

## 2019-04-19 VITALS
DIASTOLIC BLOOD PRESSURE: 99 MMHG | HEIGHT: 78 IN | HEART RATE: 99 BPM | RESPIRATION RATE: 24 BRPM | SYSTOLIC BLOOD PRESSURE: 157 MMHG | BODY MASS INDEX: 29.05 KG/M2 | OXYGEN SATURATION: 99 % | TEMPERATURE: 96.8 F

## 2019-04-19 DIAGNOSIS — M25.551 RIGHT HIP PAIN: ICD-10-CM

## 2019-04-19 DIAGNOSIS — M54.10 RADICULAR PAIN OF RIGHT LOWER EXTREMITY: ICD-10-CM

## 2019-04-19 DIAGNOSIS — M54.5 ACUTE LOW BACK PAIN, UNSPECIFIED BACK PAIN LATERALITY, WITH SCIATICA PRESENCE UNSPECIFIED: ICD-10-CM

## 2019-04-19 LAB
GFR NON-AFRICAN AMERICAN: >60 ML/MIN
GFR SERPL CREATININE-BSD FRML MDRD: >60 ML/MIN
GFR SERPL CREATININE-BSD FRML MDRD: NORMAL ML/MIN/{1.73_M2}
GLUCOSE BLD-MCNC: 97 MG/DL (ref 74–100)
POC CREATININE: 1.14 MG/DL (ref 0.51–1.19)
POC POTASSIUM: 6.1 MMOL/L (ref 3.5–4.5)

## 2019-04-19 PROCEDURE — 82565 ASSAY OF CREATININE: CPT

## 2019-04-19 PROCEDURE — 82947 ASSAY GLUCOSE BLOOD QUANT: CPT

## 2019-04-19 PROCEDURE — 7100000001 HC PACU RECOVERY - ADDTL 15 MIN

## 2019-04-19 PROCEDURE — 6370000000 HC RX 637 (ALT 250 FOR IP): Performed by: ANESTHESIOLOGY

## 2019-04-19 PROCEDURE — 84132 ASSAY OF SERUM POTASSIUM: CPT

## 2019-04-19 PROCEDURE — 2500000003 HC RX 250 WO HCPCS: Performed by: NURSE ANESTHETIST, CERTIFIED REGISTERED

## 2019-04-19 PROCEDURE — 2580000003 HC RX 258: Performed by: NURSE ANESTHETIST, CERTIFIED REGISTERED

## 2019-04-19 PROCEDURE — 3700000001 HC ADD 15 MINUTES (ANESTHESIA)

## 2019-04-19 PROCEDURE — 7100000011 HC PHASE II RECOVERY - ADDTL 15 MIN

## 2019-04-19 PROCEDURE — 7100000000 HC PACU RECOVERY - FIRST 15 MIN

## 2019-04-19 PROCEDURE — 3700000000 HC ANESTHESIA ATTENDED CARE

## 2019-04-19 PROCEDURE — 6360000002 HC RX W HCPCS: Performed by: NURSE ANESTHETIST, CERTIFIED REGISTERED

## 2019-04-19 PROCEDURE — 72148 MRI LUMBAR SPINE W/O DYE: CPT

## 2019-04-19 PROCEDURE — 7100000010 HC PHASE II RECOVERY - FIRST 15 MIN

## 2019-04-19 RX ORDER — FENTANYL CITRATE 50 UG/ML
25 INJECTION, SOLUTION INTRAMUSCULAR; INTRAVENOUS EVERY 5 MIN PRN
Status: DISCONTINUED | OUTPATIENT
Start: 2019-04-19 | End: 2019-04-22 | Stop reason: HOSPADM

## 2019-04-19 RX ORDER — ONDANSETRON 2 MG/ML
INJECTION INTRAMUSCULAR; INTRAVENOUS PRN
Status: DISCONTINUED | OUTPATIENT
Start: 2019-04-19 | End: 2019-04-19 | Stop reason: SDUPTHER

## 2019-04-19 RX ORDER — LABETALOL HYDROCHLORIDE 5 MG/ML
5 INJECTION, SOLUTION INTRAVENOUS EVERY 10 MIN PRN
Status: DISCONTINUED | OUTPATIENT
Start: 2019-04-19 | End: 2019-04-22 | Stop reason: HOSPADM

## 2019-04-19 RX ORDER — DIPHENHYDRAMINE HYDROCHLORIDE 50 MG/ML
12.5 INJECTION INTRAMUSCULAR; INTRAVENOUS
Status: ACTIVE | OUTPATIENT
Start: 2019-04-19 | End: 2019-04-19

## 2019-04-19 RX ORDER — PROPOFOL 10 MG/ML
INJECTION, EMULSION INTRAVENOUS PRN
Status: DISCONTINUED | OUTPATIENT
Start: 2019-04-19 | End: 2019-04-19 | Stop reason: SDUPTHER

## 2019-04-19 RX ORDER — SODIUM CHLORIDE, SODIUM LACTATE, POTASSIUM CHLORIDE, CALCIUM CHLORIDE 600; 310; 30; 20 MG/100ML; MG/100ML; MG/100ML; MG/100ML
INJECTION, SOLUTION INTRAVENOUS CONTINUOUS PRN
Status: DISCONTINUED | OUTPATIENT
Start: 2019-04-19 | End: 2019-04-19 | Stop reason: SDUPTHER

## 2019-04-19 RX ORDER — OXYCODONE HYDROCHLORIDE AND ACETAMINOPHEN 5; 325 MG/1; MG/1
2 TABLET ORAL PRN
Status: COMPLETED | OUTPATIENT
Start: 2019-04-19 | End: 2019-04-19

## 2019-04-19 RX ORDER — MIDAZOLAM HYDROCHLORIDE 1 MG/ML
INJECTION INTRAMUSCULAR; INTRAVENOUS PRN
Status: DISCONTINUED | OUTPATIENT
Start: 2019-04-19 | End: 2019-04-19 | Stop reason: SDUPTHER

## 2019-04-19 RX ORDER — MORPHINE SULFATE 2 MG/ML
2 INJECTION, SOLUTION INTRAMUSCULAR; INTRAVENOUS EVERY 5 MIN PRN
Status: DISCONTINUED | OUTPATIENT
Start: 2019-04-19 | End: 2019-04-22 | Stop reason: HOSPADM

## 2019-04-19 RX ORDER — FENTANYL CITRATE 50 UG/ML
INJECTION, SOLUTION INTRAMUSCULAR; INTRAVENOUS PRN
Status: DISCONTINUED | OUTPATIENT
Start: 2019-04-19 | End: 2019-04-19 | Stop reason: SDUPTHER

## 2019-04-19 RX ORDER — LIDOCAINE HYDROCHLORIDE 10 MG/ML
INJECTION, SOLUTION INFILTRATION; PERINEURAL PRN
Status: DISCONTINUED | OUTPATIENT
Start: 2019-04-19 | End: 2019-04-19 | Stop reason: SDUPTHER

## 2019-04-19 RX ORDER — PROPOFOL 10 MG/ML
INJECTION, EMULSION INTRAVENOUS CONTINUOUS PRN
Status: DISCONTINUED | OUTPATIENT
Start: 2019-04-19 | End: 2019-04-19 | Stop reason: SDUPTHER

## 2019-04-19 RX ORDER — ONDANSETRON 2 MG/ML
4 INJECTION INTRAMUSCULAR; INTRAVENOUS
Status: ACTIVE | OUTPATIENT
Start: 2019-04-19 | End: 2019-04-19

## 2019-04-19 RX ORDER — OXYCODONE HYDROCHLORIDE AND ACETAMINOPHEN 5; 325 MG/1; MG/1
1 TABLET ORAL PRN
Status: COMPLETED | OUTPATIENT
Start: 2019-04-19 | End: 2019-04-19

## 2019-04-19 RX ADMIN — PROPOFOL 100 MCG/KG/MIN: 10 INJECTION, EMULSION INTRAVENOUS at 10:02

## 2019-04-19 RX ADMIN — FENTANYL CITRATE 25 MCG: 50 INJECTION INTRAMUSCULAR; INTRAVENOUS at 10:02

## 2019-04-19 RX ADMIN — FENTANYL CITRATE 25 MCG: 50 INJECTION INTRAMUSCULAR; INTRAVENOUS at 10:13

## 2019-04-19 RX ADMIN — FENTANYL CITRATE 100 MCG: 50 INJECTION INTRAMUSCULAR; INTRAVENOUS at 09:59

## 2019-04-19 RX ADMIN — MIDAZOLAM HYDROCHLORIDE 1 MG: 1 INJECTION, SOLUTION INTRAMUSCULAR; INTRAVENOUS at 10:07

## 2019-04-19 RX ADMIN — MIDAZOLAM HYDROCHLORIDE 1 MG: 1 INJECTION, SOLUTION INTRAMUSCULAR; INTRAVENOUS at 10:02

## 2019-04-19 RX ADMIN — SODIUM CHLORIDE, POTASSIUM CHLORIDE, SODIUM LACTATE AND CALCIUM CHLORIDE: 600; 310; 30; 20 INJECTION, SOLUTION INTRAVENOUS at 09:59

## 2019-04-19 RX ADMIN — ONDANSETRON 4 MG: 2 INJECTION, SOLUTION INTRAMUSCULAR; INTRAVENOUS at 10:47

## 2019-04-19 RX ADMIN — OXYCODONE HYDROCHLORIDE AND ACETAMINOPHEN 2 TABLET: 5; 325 TABLET ORAL at 11:39

## 2019-04-19 RX ADMIN — LIDOCAINE HYDROCHLORIDE 50 MG: 10 INJECTION, SOLUTION INFILTRATION; PERINEURAL at 10:02

## 2019-04-19 RX ADMIN — FENTANYL CITRATE 50 MCG: 50 INJECTION INTRAMUSCULAR; INTRAVENOUS at 10:35

## 2019-04-19 RX ADMIN — MIDAZOLAM HYDROCHLORIDE 2 MG: 1 INJECTION, SOLUTION INTRAMUSCULAR; INTRAVENOUS at 09:59

## 2019-04-19 RX ADMIN — PROPOFOL 200 MG: 10 INJECTION, EMULSION INTRAVENOUS at 10:12

## 2019-04-19 ASSESSMENT — PAIN DESCRIPTION - PAIN TYPE: TYPE: CHRONIC PAIN

## 2019-04-19 ASSESSMENT — LIFESTYLE VARIABLES: SMOKING_STATUS: 0

## 2019-04-19 ASSESSMENT — PAIN DESCRIPTION - ORIENTATION: ORIENTATION: RIGHT

## 2019-04-19 ASSESSMENT — PAIN DESCRIPTION - LOCATION: LOCATION: LEG

## 2019-04-19 ASSESSMENT — PAIN SCALES - GENERAL
PAINLEVEL_OUTOF10: 9
PAINLEVEL_OUTOF10: 9
PAINLEVEL_OUTOF10: 7

## 2019-04-19 ASSESSMENT — PAIN - FUNCTIONAL ASSESSMENT: PAIN_FUNCTIONAL_ASSESSMENT: 0-10

## 2019-04-19 NOTE — ANESTHESIA PRE PROCEDURE
 cyclobenzaprine (FLEXERIL) 10 MG tablet Take 10 mg by mouth 3 times daily as needed for Muscle spasms       No current facility-administered medications for this encounter. Allergies:  No Known Allergies    Problem List:    Patient Active Problem List   Diagnosis Code    Essential hypertension I10    Chronic pain of left knee M25.562, G89.29    Tobacco abuse Z72.0    Edema of both lower legs due to peripheral venous insufficiency I87.2, R60.9       Past Medical History:        Diagnosis Date    GERD (gastroesophageal reflux disease)     Hypertension     PCP [facility] ?/ [facility] in [place]/ pt. doesn't remember last name    Pain     lower back       Past Surgical History:  No past surgical history on file. Social History:    Social History     Tobacco Use    Smoking status: Current Every Day Smoker     Packs/day: 0.50     Types: Cigarettes    Smokeless tobacco: Never Used   Substance Use Topics    Alcohol use: Yes     Alcohol/week: 3.6 oz     Types: 6 Cans of beer per week     Comment: weekly                                Ready to quit: Not Answered  Counseling given: Not Answered      Vital Signs (Current): There were no vitals filed for this visit.                                            BP Readings from Last 3 Encounters:   04/18/19 (!) 162/102   04/12/19 (!) 156/88   04/08/19 (!) 142/86       NPO Status:                                                                                 BMI:   Wt Readings from Last 3 Encounters:   04/18/19 251 lb 6.4 oz (114 kg)   04/17/19 250 lb (113.4 kg)   04/12/19 250 lb (113.4 kg)     There is no height or weight on file to calculate BMI.    CBC:   Lab Results   Component Value Date    WBC 8.7 04/12/2019    RBC 4.48 04/12/2019    HGB 15.1 04/12/2019    HCT 43.8 04/12/2019    MCV 97.8 04/12/2019    RDW 13.5 04/12/2019     04/12/2019       CMP:   Lab Results   Component Value Date     04/12/2019    K 3.9 04/12/2019     04/12/2019    CO2 26 04/12/2019    BUN 14 04/12/2019    CREATININE 1.01 04/12/2019    GFRAA >60 04/12/2019    LABGLOM >60 04/12/2019    GLUCOSE 93 04/12/2019    PROT 8.2 04/12/2019    CALCIUM 9.7 04/12/2019    BILITOT 0.30 04/12/2019    ALKPHOS 65 04/12/2019    AST 24 04/12/2019    ALT 46 04/12/2019       POC Tests: No results for input(s): POCGLU, POCNA, POCK, POCCL, POCBUN, POCHEMO, POCHCT in the last 72 hours. Coags: No results found for: PROTIME, INR, APTT    HCG (If Applicable): No results found for: PREGTESTUR, PREGSERUM, HCG, HCGQUANT     ABGs: No results found for: PHART, PO2ART, GGE3ZHV, MHI3KWC, BEART, J9ZEKUEY     Type & Screen (If Applicable):  No results found for: LABABO, [address]    Anesthesia Evaluation  Patient summary reviewed and Nursing notes reviewed no history of anesthetic complications:   Airway: Mallampati: II  TM distance: >3 FB   Neck ROM: full  Mouth opening: > = 3 FB Dental: normal exam         Pulmonary:       (-) pneumonia, sleep apnea and not a current smoker                           Cardiovascular:    (+) hypertension:,     (-) past MI and  angina      Rhythm: regular             Beta Blocker:  Not on Beta Blocker         Neuro/Psych:      (-) seizures and CVA           GI/Hepatic/Renal:   (+) GERD:,           Endo/Other:        (-) diabetes mellitus, hypothyroidism               Abdominal:           Vascular:     - DVT and PE. Anesthesia Plan      MAC and general     ASA 2     (HTN, GERD,  Back pain)  Induction: intravenous. Anesthetic plan and risks discussed with patient. Use of blood products discussed with patient whom. Plan discussed with attending.                   Master Monterrosoi, APRN - CRNA   4/19/2019

## 2019-04-19 NOTE — ANESTHESIA POSTPROCEDURE EVALUATION
Department of Anesthesiology  Postprocedure Note    Patient: Naomy Lombardi  MRN: [id number]  YOB: 1975  Date of evaluation: 4/19/2019  Time:  12:19 PM     Procedure Summary     Date:  04/19/19 Room / Location:  [facility]    Anesthesia Start:  0959 Anesthesia Stop:  1055    Procedure:  MRI LUMBAR SPINE WO CONTRAST Diagnosis:       Acute low back pain, unspecified back pain laterality, with sciatica presence unspecified      Radicular pain of right lower extremity      Right hip pain    Scheduled Providers:  KALE Boyce - CRNA Responsible Provider:  Lacy Burgos MD    Anesthesia Type:  MAC, general ASA Status:  2          Anesthesia Type: MAC, general    Amberly Phase I: Amberly Score: 10    Amberly Phase II: Amberly Score: 10    Last vitals: Reviewed and per EMR flowsheets.        Anesthesia Post Evaluation    Patient location during evaluation: PACU  Patient participation: complete - patient participated  Level of consciousness: awake and alert  Pain score: 5  Nausea & Vomiting: no nausea  Cardiovascular status: hemodynamically stable  Respiratory status: room air  Hydration status: euvolemic

## 2019-04-19 NOTE — H&P
History and Physical    Pt Name: Suma Freire  MRN: [id number]  YOB: 1975  Date of evaluation: 4/19/2019  Primary Care Physician: Dimas Hamman, APRN - CNP  Patient evaluated at the request of  Dr. Ren Ascencio    Reason for evaluation:    Severe lumbar pain   SUBJECTIVE:   History of Chief Complaint:   According to chart note in Apr/2019     Suma Freire is a 53 y.o. male who presents today for ongoing back pain, located to mid to lower spine, pain is still severe, despite treatment with muscle relaxants, steroids and over the last two weeks narcotic pain medication, at best pain is a 7/10, he is only sleep an hour or two during night because of the pain. He is to f/u with Dr Ren Ascencio after the MRI of his back that is to be done at [facility] tomorrow under sedation.       4/8/2019  Paul Ozuna is a 37 y. o. male who presents today for lower back pain, onset was about 2 weeks ago and gradually worsened. He went to [facility] ED on 4/5; he states they did an xray, gave 2 IM injections, tramadol and Flexeril. That same night he went to [facility] ED/ again was diagnosed with sciatica Steroids-decadron, and pain meds were given, which lasted a few nights. Last night pain became worse, he was given Toradol 60 mg,  Solumedrol, and prescribed Prednisone, Motrin Norflex. In office today with middle to lower back pain, worse with movement, walking and sitting. Is not relieved with above treatments. Assocated s/s include: right leg weakness, right LE paraesthesias. Denies any dysuria, urinary frequency, hematuria or loss of bowel or bladder. He denies any trauma or known injury, however does recall having sex on the couch when back pain started. Denies any h/o of back injuries, scoliosis or surgeries.          unknown cause of  Severe lumbar pain , just work up with the pain 2 weeks ago he reports ,  Has throbbing right groin pain and can not sleep flat .                 Past Medical History      has a past medical history of GERD (gastroesophageal reflux disease), Hypertension, and Pain. Past Surgical History   has no past surgical history on file. Medications   Scheduled Meds:  Current Outpatient Rx   Medication Sig Dispense Refill    orphenadrine (NORFLEX) 100 MG extended release tablet       predniSONE (DELTASONE) 10 MG tablet       hydrochlorothiazide (HYDRODIURIL) 12.5 MG tablet Take 1 tablet by mouth daily 30 tablet 3    polyethylene glycol (MIRALAX) powder Take 17 g by mouth daily 1/2 cap with cup of fluid daily prn for constipation 289 g 1    oxyCODONE-acetaminophen (PERCOCET) 5-325 MG per tablet Take 1-2 tablets by mouth every 8 hours as needed for Pain for up to 30 days. 30 tablet 0    ibuprofen (ADVIL;MOTRIN) 800 MG tablet Take 1 tablet by mouth every 8 hours as needed for Pain 30 tablet 0    cyclobenzaprine (FLEXERIL) 10 MG tablet Take 10 mg by mouth 3 times daily as needed for Muscle spasms       Continuous Infusions:  PRN Meds:. Allergies  has No Known Allergies. Family History    family history includes Cancer in his mother; Heart Disease in his father; High Blood Pressure in his father; High Cholesterol in his father; Vision Loss in his father. Family Status   Relation Name Status    Mother      Father           Social History  Social History     Socioeconomic History    Marital status:      Spouse name: Not on file    Number of children: Not on file    Years of education: Not on file    Highest education level: Not on file   Occupational History    Not on file   Social Needs    Financial resource strain: Not on file    Food insecurity:     Worry: Not on file     Inability: Not on file    Transportation needs:     Medical: Not on file     Non-medical: Not on file   Tobacco Use    Smoking status: Current Every Day Smoker     Packs/day: 0.50     Types: Cigarettes    Smokeless tobacco: Never Used   Substance and Sexual Activity    Alcohol use:  Yes Alcohol/week: 3.6 oz     Types: 6 Cans of beer per week     Comment: weekly    Drug use: No    Sexual activity: Not on file   Lifestyle    Physical activity:     Days per week: Not on file     Minutes per session: Not on file    Stress: Not on file   Relationships    Social connections:     Talks on phone: Not on file     Gets together: Not on file     Attends [employer] service: Not on file     Active member of club or organization: Not on file     Attends meetings of clubs or organizations: Not on file     Relationship status: Not on file    Intimate partner violence:     Fear of current or ex partner: Not on file     Emotionally abused: Not on file     Physically abused: Not on file     Forced sexual activity: Not on file   Other Topics Concern    Not on file   Social History Narrative    Not on file        Service:No    Occupation:  Works with [employer]     Hobbies:  Was avid with basketball could [employer]     OBJECTIVE:   VITALS:  height is 6' 6\" (1.981 m). His temporal temperature is 98.8 °F (37.1 °C). His blood pressure is 189/113 (abnormal) and his pulse is 90. His respiration is 20 and oxygen saturation is 100%. CONSTITUTIONAL: Alert and oriented times 3, no acute distress and cooperative to examination. friendly and pleasant , very tall , muscular , robust build . SKIN: rash No    HEENT: Head is normocephalic, atraumatic. EOMI, PERRLA    Oral air way :slightly narrow Yes    NECK: neck supple, no lymphadenopathy noted, trachea midline and straight       2+ carotid, no bruit    LUNGS: Chest expands equally bilaterally upon respiration, no accessory muscle used. Ausculation reveals no adventitious breath sounds. CARDIOVASCULAR: \"Heart sounds are normal.  Regular rate and rhythm without murmur,    ABDOMEN: Bowel sounds are present in all four quadrants      GENATALIA:Deferred. NEUROLOGIC: \"CN II-XII are grossly intact.        EXTREMITIES: Pitting edema:  No,  Varicose veins: No Dorsal pedal/posterior tibial pulses palpable: Yes         Strength:  Normal       Patient Active Problem List   Diagnosis    Essential hypertension    Chronic pain of left knee    Tobacco abuse    Edema of both lower legs due to peripheral venous insufficiency               IMPRESSIONS:   1. Severe lumbar pain   2. does not have any pertinent problems on file.     Effie Adams [facility]  Electronically signed 4/19/2019 at 9:34 AM       Scheduled for: lumbar MRI

## 2019-04-19 NOTE — PROGRESS NOTES
Discharge instructions reviewed with patient and mother in law. Both acknowledged understanding. No prescriptions given.

## 2019-04-22 ENCOUNTER — OFFICE VISIT (OUTPATIENT)
Dept: ORTHOPEDIC SURGERY | Age: 44
End: 2019-04-22
Payer: COMMERCIAL

## 2019-04-22 DIAGNOSIS — M54.10 RADICULAR PAIN OF RIGHT LOWER EXTREMITY: ICD-10-CM

## 2019-04-22 DIAGNOSIS — M54.5 ACUTE LOW BACK PAIN, UNSPECIFIED BACK PAIN LATERALITY, WITH SCIATICA PRESENCE UNSPECIFIED: Primary | ICD-10-CM

## 2019-04-22 DIAGNOSIS — M51.26 LUMBAR DISC HERNIATION: ICD-10-CM

## 2019-04-22 DIAGNOSIS — M25.551 RIGHT HIP PAIN: ICD-10-CM

## 2019-04-22 PROCEDURE — 99213 OFFICE O/P EST LOW 20 MIN: CPT | Performed by: ORTHOPAEDIC SURGERY

## 2019-04-25 ENCOUNTER — HOSPITAL ENCOUNTER (OUTPATIENT)
Dept: PREADMISSION TESTING | Age: 44
Discharge: HOME | End: 2019-04-29
Payer: COMMERCIAL

## 2019-04-25 VITALS
HEIGHT: 78 IN | SYSTOLIC BLOOD PRESSURE: 147 MMHG | WEIGHT: 250.9 LBS | DIASTOLIC BLOOD PRESSURE: 93 MMHG | HEART RATE: 102 BPM | BODY MASS INDEX: 29.03 KG/M2 | TEMPERATURE: 98.6 F | RESPIRATION RATE: 22 BRPM | OXYGEN SATURATION: 99 %

## 2019-04-25 LAB
ABSOLUTE EOS #: 0.1 K/UL (ref 0–0.4)
ABSOLUTE IMMATURE GRANULOCYTE: ABNORMAL K/UL (ref 0–0.3)
ABSOLUTE LYMPH #: 2.5 K/UL (ref 1–4.8)
ABSOLUTE MONO #: 0.6 K/UL (ref 0.1–1.3)
ANION GAP SERPL CALCULATED.3IONS-SCNC: 13 MMOL/L (ref 9–17)
BASOPHILS # BLD: 1 % (ref 0–2)
BASOPHILS ABSOLUTE: 0.1 K/UL (ref 0–0.2)
BUN BLDV-MCNC: 16 MG/DL (ref 6–20)
BUN/CREAT BLD: ABNORMAL (ref 9–20)
CALCIUM SERPL-MCNC: 10.1 MG/DL (ref 8.6–10.4)
CHLORIDE BLD-SCNC: 98 MMOL/L (ref 98–107)
CO2: 26 MMOL/L (ref 20–31)
CREAT SERPL-MCNC: 0.99 MG/DL (ref 0.7–1.2)
DIFFERENTIAL TYPE: ABNORMAL
EOSINOPHILS RELATIVE PERCENT: 1 % (ref 0–4)
GFR AFRICAN AMERICAN: >60 ML/MIN
GFR NON-AFRICAN AMERICAN: >60 ML/MIN
GFR SERPL CREATININE-BSD FRML MDRD: ABNORMAL ML/MIN/{1.73_M2}
GFR SERPL CREATININE-BSD FRML MDRD: ABNORMAL ML/MIN/{1.73_M2}
GLUCOSE BLD-MCNC: 112 MG/DL (ref 70–99)
HCT VFR BLD CALC: 44.2 % (ref 41–53)
HEMOGLOBIN: 15.1 G/DL (ref 13.5–17.5)
IMMATURE GRANULOCYTES: ABNORMAL %
LYMPHOCYTES # BLD: 37 % (ref 24–44)
MCH RBC QN AUTO: 33.5 PG (ref 26–34)
MCHC RBC AUTO-ENTMCNC: 34.2 G/DL (ref 31–37)
MCV RBC AUTO: 97.9 FL (ref 80–100)
MONOCYTES # BLD: 10 % (ref 1–7)
NRBC AUTOMATED: ABNORMAL PER 100 WBC
PDW BLD-RTO: 12.9 % (ref 11.5–14.9)
PLATELET # BLD: 196 K/UL (ref 150–450)
PLATELET ESTIMATE: ABNORMAL
PMV BLD AUTO: 10 FL (ref 6–12)
POTASSIUM SERPL-SCNC: 4.5 MMOL/L (ref 3.7–5.3)
RBC # BLD: 4.52 M/UL (ref 4.5–5.9)
RBC # BLD: ABNORMAL 10*6/UL
SEG NEUTROPHILS: 51 % (ref 36–66)
SEGMENTED NEUTROPHILS ABSOLUTE COUNT: 3.4 K/UL (ref 1.3–9.1)
SODIUM BLD-SCNC: 137 MMOL/L (ref 135–144)
WBC # BLD: 6.6 K/UL (ref 3.5–11)
WBC # BLD: ABNORMAL 10*3/UL

## 2019-04-25 PROCEDURE — 85025 COMPLETE CBC W/AUTO DIFF WBC: CPT

## 2019-04-25 PROCEDURE — 36415 COLL VENOUS BLD VENIPUNCTURE: CPT

## 2019-04-25 PROCEDURE — 93005 ELECTROCARDIOGRAM TRACING: CPT

## 2019-04-25 PROCEDURE — 80048 BASIC METABOLIC PNL TOTAL CA: CPT

## 2019-04-25 ASSESSMENT — ENCOUNTER SYMPTOMS
SINUS PAIN: 0
CONSTIPATION: 0
ABDOMINAL PAIN: 0
NAUSEA: 0
VOMITING: 0
SORE THROAT: 0
SINUS PRESSURE: 0
COUGH: 0
COLOR CHANGE: 0
CHEST TIGHTNESS: 0
BACK PAIN: 1
SHORTNESS OF BREATH: 0
DIARRHEA: 0
BLOOD IN STOOL: 0
WHEEZING: 0

## 2019-04-25 NOTE — H&P
HISTORY and [facility]       NAME:  Veronika Cat  MRN: [id number]   YOB: 1975   Date: 4/25/2019   Age: 53 y.o. Gender: male     COMPLAINT ANDPRESENT HISTORY:     Veronika Cat is a 53 y.o.  male, undergoing preadmission testing for posterior lumbar discectomy L3-4. Patient reports history of acute on chronic lower back pain for the past years, increased symptoms over past three weeks. Pain is described as an ache (10/10), with radiation to right lower extremity. Patient admits to weakness/numbness in the right lower extremity. Pain is worse with activity, bending. Pain is improved mildly with pain medication. Patient denies previous history of surgery to the spine. Patient denies any incontinence of bowels/bladder, no saddle anesthesia. Patient is otherwise feeling well today, no recent fever/chills, chest pain, or shortness of breath. No history of venous thrombosis. DIAGNOSTIC RESULTS   Radiology:   Narrative   EXAMINATION:   MRI OF THE LUMBAR SPINE WITHOUT CONTRAST, 4/19/2019 10:00 am       FINDINGS:   BONES/ALIGNMENT:       There is normal alignment of the spine. The vertebral body heights are   maintained.  No evidence of discitis or osteomyelitis.       SPINAL CORD:       The conus terminates normally.       SOFT TISSUES:       No significant paraspinous abnormalities.       L1-L2: No significant disc herniation. No significant facet degenerative   changes. No significant spinal canal stenosis or neural foraminal stenosis.       L2-L3: Minimal broad disc bulge. No significant spinal canal stenosis or   neural foraminal stenosis. Mild bilateral facet degenerative changes.       L3-L4: Moderate right central and foraminal disc extrusion with superior   migration behind L3 vertebral body. There may be partially sequestered disc   fragment. There is extension of this fragment into right neural foramina.    Severe right neural foraminal stenosis and severe right lateral recess   stenosis.  No significant left lateral recess stenosis, or left neural   foraminal stenosis.  No significant spinal canal stenosis.       L4-L5: Posterior disc osteophyte complex with bilateral facet degenerative   changes. No significant spinal canal stenosis. Mild bilateral neural   foraminal stenosis.  Mild broad disc bulge.       L5-S1: Mild broad disc bulge. Superimposed small central and left central   disc protrusion with annular fissure. No significant spinal canal stenosis,   or neural foraminal stenosis. Mild bilateral facet degenerative changes.       Impression   1. Moderate right central and foraminal disc extrusion with superior   migration behind L3 vertebral body. This extends into right neural foramina   at L3-L4, with possible sequestered disc fragment. Severe right lateral   recess stenosis and severe right neural foraminal stenosis at L3-L4.  No   significant spinal canal stenosis at L3-L4.       2. Small central and left central disc protrusion with annular fissure at   L5-S1.  No significant spinal canal stenosis or neural foraminal stenosis at   L5-S1.       3.  Additional multilevel degenerative changes of the lumbar spine as detailed   above.         PAST MEDICAL HISTORY     Past Medical History:   Diagnosis Date    GERD (gastroesophageal reflux disease)     Hypertension     Pain     lower back    Wears glasses      Pt denies any history of:  Diabetes Mellitus, Hyperlipidemia, Coronary Artery Disease, CVA/TIA, Asthma/COPD, Thyroid Disease, CKD, Cancer, Seizures, Hepatitis, Tuberculosis    SURGICAL HISTORY       Past Surgical History:   Procedure Laterality Date    MRI UPPER EXTREMITY W OR WO CONTRAST  04/19/2019    lumbar spine without contrast     FAMILY HISTORY       Family History   Problem Relation Age of Onset    Cancer Mother     High Blood Pressure Father     High Cholesterol Father     Heart Disease Father     Vision Loss Father      SOCIAL HISTORY       Social History     Socioeconomic History    Marital status:      Spouse name: None    Number of children: None    Years of education: None    Highest education level: None   Occupational History    None   Social Needs    Financial resource strain: None    Food insecurity:     Worry: None     Inability: None    Transportation needs:     Medical: None     Non-medical: None   Tobacco Use    Smoking status: Current Every Day Smoker     Packs/day: 0.50     Types: Cigarettes    Smokeless tobacco: Never Used   Substance and Sexual Activity    Alcohol use: Yes     Alcohol/week: 3.6 oz     Types: 6 Cans of beer per week     Comment: weekly    Drug use: No    Sexual activity: None   Lifestyle    Physical activity:     Days per week: None     Minutes per session: None    Stress: None   Relationships    Social connections:     Talks on phone: None     Gets together: None     Attends [employer] service: None     Active member of club or organization: None     Attends meetings of clubs or organizations: None     Relationship status: None    Intimate partner violence:     Fear of current or ex partner: None     Emotionally abused: None     Physically abused: None     Forced sexual activity: None   Other Topics Concern    None   Social History Narrative    None     REVIEW OF SYSTEMS      No Known Allergies    Current Outpatient Medications on File Prior to Encounter   Medication Sig Dispense Refill    oxyCODONE-acetaminophen (PERCOCET) 5-325 MG per tablet Take 1-2 tablets by mouth every 8 hours as needed for Pain for up to 10 days.  60 tablet 0    hydrochlorothiazide (HYDRODIURIL) 12.5 MG tablet Take 1 tablet by mouth daily 30 tablet 3    polyethylene glycol (MIRALAX) powder Take 17 g by mouth daily 1/2 cap with cup of fluid daily prn for constipation 289 g 1    ibuprofen (ADVIL;MOTRIN) 800 MG tablet Take 1 tablet by mouth every 8 hours as needed for Pain 30 tablet 0    cyclobenzaprine (FLEXERIL) 10 MG tablet Take 10 mg by mouth 3 times daily as needed for Muscle spasms       No current facility-administered medications on file prior to encounter. Review of Systems   Constitutional: Positive for activity change. Negative for chills, fatigue and fever. HENT: Negative for congestion, ear discharge, ear pain, postnasal drip, sinus pressure, sinus pain and sore throat. Respiratory: Negative for cough, chest tightness, shortness of breath and wheezing. Cardiovascular: Positive for leg swelling. Negative for chest pain and palpitations. Gastrointestinal: Negative for abdominal pain, blood in stool, constipation, diarrhea, nausea and vomiting. Genitourinary: Negative for difficulty urinating, dysuria, flank pain, frequency, hematuria and urgency. Musculoskeletal: Positive for back pain. Negative for arthralgias, joint swelling, neck pain and neck stiffness. Skin: Negative for color change, rash and wound. Neurological: Negative for dizziness, seizures, syncope, speech difficulty, weakness, light-headedness, numbness and headaches. GENERAL PHYSICAL EXAM:     Vitals: BP (!) 147/93   Pulse 102   Temp 98.6 °F (37 °C) (Oral)   Resp 22   Ht 6' 6\" (1.981 m)   Wt 250 lb 14.4 oz (113.8 kg)   SpO2 99%   BMI 28.99 kg/m²  Body mass index is 28.99 kg/m². GENERAL APPEARANCE: Yany Napier is a 53 y.o.   male, not obese, nourished, conscious, alert. Does not appear to be in any distress or pain at this time. SKIN:  Normal temperature, turgor and texture. No cyanosis or jaundice. HEAD:  Normocephalic, atraumatic. EYES:  Pupils equal, reactive to light andaccomodation. Conjunctiva clear. EOMs intact bilaterally. THROAT:  Mucous membranes moist. No tonsillar erythema or exudates. NECK:  No stiffness, trachea central.  No palpable masses.                  CHEST:  Symmetrical and equal on expansion. HEART:  Hypertensive. Regular rate, rhythm. No murmurs. LUNGS:  Equal on expansion. Clear to auscultation with no adventitious sounds. ABDOMEN:  Normoactive bowel sounds. Soft on palpation. No localized tenderness, guarding or rigidity. No palpable organomegaly. LYMPHATICS:  No palpable cervical lymphadenopathy. LOCOMOTOR, BACK AND SPINE:  Tenderness to palpation lumbar spine, pain with straight leg raise. No flank tenderness. EXTREMITIES:  Symmetrical with 2+ pedal edema. No discoloration or ulcerations. No warmth, tenderness, erythema noted in lower legs bilaterally. NEUROLOGIC:  The patient is conscious, alert, oriented. No focal sensory or motor deficits. Cranial nerve exam reveals no deficits.                                                                        PROVISIONAL DIAGNOSES / SURGERY:      Disc Herniation, L3-4  Lumbar Discectomy, L3-4        Shailesh Polk PA-C on4/25/2019 at 10:14 AM

## 2019-05-08 ENCOUNTER — APPOINTMENT (OUTPATIENT)
Dept: GENERAL RADIOLOGY | Age: 44
End: 2019-05-08
Attending: ORTHOPAEDIC SURGERY
Payer: COMMERCIAL

## 2019-05-08 ENCOUNTER — HOSPITAL ENCOUNTER (OUTPATIENT)
Age: 44
Setting detail: OUTPATIENT SURGERY
Discharge: HOME | End: 2019-05-08
Attending: ORTHOPAEDIC SURGERY | Admitting: ORTHOPAEDIC SURGERY
Payer: COMMERCIAL

## 2019-05-08 ENCOUNTER — ANESTHESIA (OUTPATIENT)
Dept: OPERATING ROOM | Age: 44
End: 2019-05-08
Payer: COMMERCIAL

## 2019-05-08 ENCOUNTER — ANESTHESIA EVENT (OUTPATIENT)
Dept: OPERATING ROOM | Age: 44
End: 2019-05-08
Payer: COMMERCIAL

## 2019-05-08 VITALS — OXYGEN SATURATION: 100 % | SYSTOLIC BLOOD PRESSURE: 139 MMHG | TEMPERATURE: 96.1 F | DIASTOLIC BLOOD PRESSURE: 83 MMHG

## 2019-05-08 VITALS
RESPIRATION RATE: 16 BRPM | DIASTOLIC BLOOD PRESSURE: 84 MMHG | HEIGHT: 78 IN | TEMPERATURE: 97.8 F | BODY MASS INDEX: 28.93 KG/M2 | SYSTOLIC BLOOD PRESSURE: 158 MMHG | OXYGEN SATURATION: 96 % | WEIGHT: 250 LBS | HEART RATE: 70 BPM

## 2019-05-08 DIAGNOSIS — M51.26 LUMBAR DISC HERNIATION: Primary | ICD-10-CM

## 2019-05-08 PROCEDURE — 7100000030 HC ASPR PHASE II RECOVERY - FIRST 15 MIN: Performed by: ORTHOPAEDIC SURGERY

## 2019-05-08 PROCEDURE — 3700000001 HC ADD 15 MINUTES (ANESTHESIA): Performed by: ORTHOPAEDIC SURGERY

## 2019-05-08 PROCEDURE — 6360000002 HC RX W HCPCS: Performed by: ORTHOPAEDIC SURGERY

## 2019-05-08 PROCEDURE — 2720000010 HC SURG SUPPLY STERILE: Performed by: ORTHOPAEDIC SURGERY

## 2019-05-08 PROCEDURE — 6360000002 HC RX W HCPCS: Performed by: ANESTHESIOLOGY

## 2019-05-08 PROCEDURE — 2580000003 HC RX 258: Performed by: ORTHOPAEDIC SURGERY

## 2019-05-08 PROCEDURE — 3700000000 HC ANESTHESIA ATTENDED CARE: Performed by: ORTHOPAEDIC SURGERY

## 2019-05-08 PROCEDURE — 72100 X-RAY EXAM L-S SPINE 2/3 VWS: CPT

## 2019-05-08 PROCEDURE — 6370000000 HC RX 637 (ALT 250 FOR IP): Performed by: ANESTHESIOLOGY

## 2019-05-08 PROCEDURE — 2500000003 HC RX 250 WO HCPCS: Performed by: NURSE ANESTHETIST, CERTIFIED REGISTERED

## 2019-05-08 PROCEDURE — 7100000001 HC PACU RECOVERY - ADDTL 15 MIN: Performed by: ORTHOPAEDIC SURGERY

## 2019-05-08 PROCEDURE — 6360000002 HC RX W HCPCS: Performed by: NURSE ANESTHETIST, CERTIFIED REGISTERED

## 2019-05-08 PROCEDURE — 22224 OSTEOT DSC ANT 1VRT SGM LMBR: CPT | Performed by: ORTHOPAEDIC SURGERY

## 2019-05-08 PROCEDURE — 3600000013 HC SURGERY LEVEL 3 ADDTL 15MIN: Performed by: ORTHOPAEDIC SURGERY

## 2019-05-08 PROCEDURE — 2709999900 HC NON-CHARGEABLE SUPPLY: Performed by: ORTHOPAEDIC SURGERY

## 2019-05-08 PROCEDURE — 3600000003 HC SURGERY LEVEL 3 BASE: Performed by: ORTHOPAEDIC SURGERY

## 2019-05-08 PROCEDURE — 3209999900 FLUORO FOR SURGICAL PROCEDURES

## 2019-05-08 PROCEDURE — 7100000000 HC PACU RECOVERY - FIRST 15 MIN: Performed by: ORTHOPAEDIC SURGERY

## 2019-05-08 PROCEDURE — 7100000031 HC ASPR PHASE II RECOVERY - ADDTL 15 MIN: Performed by: ORTHOPAEDIC SURGERY

## 2019-05-08 PROCEDURE — 2500000003 HC RX 250 WO HCPCS: Performed by: ORTHOPAEDIC SURGERY

## 2019-05-08 RX ORDER — LABETALOL 20 MG/4 ML (5 MG/ML) INTRAVENOUS SYRINGE
5 EVERY 10 MIN PRN
Status: DISCONTINUED | OUTPATIENT
Start: 2019-05-08 | End: 2019-05-08 | Stop reason: HOSPADM

## 2019-05-08 RX ORDER — MIDAZOLAM HYDROCHLORIDE 1 MG/ML
INJECTION INTRAMUSCULAR; INTRAVENOUS PRN
Status: DISCONTINUED | OUTPATIENT
Start: 2019-05-08 | End: 2019-05-08 | Stop reason: SDUPTHER

## 2019-05-08 RX ORDER — NEOSTIGMINE METHYLSULFATE 5 MG/5 ML
SYRINGE (ML) INTRAVENOUS PRN
Status: DISCONTINUED | OUTPATIENT
Start: 2019-05-08 | End: 2019-05-08 | Stop reason: SDUPTHER

## 2019-05-08 RX ORDER — SUCCINYLCHOLINE/SOD CL,ISO/PF 200MG/10ML
SYRINGE (ML) INTRAVENOUS PRN
Status: DISCONTINUED | OUTPATIENT
Start: 2019-05-08 | End: 2019-05-08 | Stop reason: SDUPTHER

## 2019-05-08 RX ORDER — MORPHINE SULFATE 2 MG/ML
2 INJECTION, SOLUTION INTRAMUSCULAR; INTRAVENOUS EVERY 5 MIN PRN
Status: DISCONTINUED | OUTPATIENT
Start: 2019-05-08 | End: 2019-05-08 | Stop reason: HOSPADM

## 2019-05-08 RX ORDER — PROPOFOL 10 MG/ML
INJECTION, EMULSION INTRAVENOUS PRN
Status: DISCONTINUED | OUTPATIENT
Start: 2019-05-08 | End: 2019-05-08 | Stop reason: SDUPTHER

## 2019-05-08 RX ORDER — FENTANYL CITRATE 50 UG/ML
25 INJECTION, SOLUTION INTRAMUSCULAR; INTRAVENOUS EVERY 5 MIN PRN
Status: DISCONTINUED | OUTPATIENT
Start: 2019-05-08 | End: 2019-05-08 | Stop reason: HOSPADM

## 2019-05-08 RX ORDER — ROCURONIUM BROMIDE 10 MG/ML
INJECTION, SOLUTION INTRAVENOUS PRN
Status: DISCONTINUED | OUTPATIENT
Start: 2019-05-08 | End: 2019-05-08 | Stop reason: SDUPTHER

## 2019-05-08 RX ORDER — HYDROMORPHONE HCL 110MG/55ML
PATIENT CONTROLLED ANALGESIA SYRINGE INTRAVENOUS PRN
Status: DISCONTINUED | OUTPATIENT
Start: 2019-05-08 | End: 2019-05-08 | Stop reason: SDUPTHER

## 2019-05-08 RX ORDER — LIDOCAINE HYDROCHLORIDE 10 MG/ML
INJECTION, SOLUTION EPIDURAL; INFILTRATION; INTRACAUDAL; PERINEURAL PRN
Status: DISCONTINUED | OUTPATIENT
Start: 2019-05-08 | End: 2019-05-08 | Stop reason: SDUPTHER

## 2019-05-08 RX ORDER — TRANEXAMIC ACID 100 MG/ML
INJECTION, SOLUTION INTRAVENOUS PRN
Status: DISCONTINUED | OUTPATIENT
Start: 2019-05-08 | End: 2019-05-08 | Stop reason: SDUPTHER

## 2019-05-08 RX ORDER — DIPHENHYDRAMINE HYDROCHLORIDE 50 MG/ML
12.5 INJECTION INTRAMUSCULAR; INTRAVENOUS
Status: DISCONTINUED | OUTPATIENT
Start: 2019-05-08 | End: 2019-05-08 | Stop reason: HOSPADM

## 2019-05-08 RX ORDER — HYDRALAZINE HYDROCHLORIDE 20 MG/ML
INJECTION INTRAMUSCULAR; INTRAVENOUS PRN
Status: DISCONTINUED | OUTPATIENT
Start: 2019-05-08 | End: 2019-05-08 | Stop reason: SDUPTHER

## 2019-05-08 RX ORDER — OXYCODONE HYDROCHLORIDE AND ACETAMINOPHEN 5; 325 MG/1; MG/1
1-2 TABLET ORAL EVERY 4 HOURS PRN
Qty: 40 TABLET | Refills: 0 | Status: SHIPPED | OUTPATIENT
Start: 2019-05-08 | End: 2019-12-04

## 2019-05-08 RX ORDER — HYDRALAZINE HYDROCHLORIDE 20 MG/ML
5 INJECTION INTRAMUSCULAR; INTRAVENOUS EVERY 10 MIN PRN
Status: DISCONTINUED | OUTPATIENT
Start: 2019-05-08 | End: 2019-05-08 | Stop reason: HOSPADM

## 2019-05-08 RX ORDER — HYDROCODONE BITARTRATE AND ACETAMINOPHEN 5; 325 MG/1; MG/1
1 TABLET ORAL PRN
Status: DISCONTINUED | OUTPATIENT
Start: 2019-05-08 | End: 2019-05-08 | Stop reason: HOSPADM

## 2019-05-08 RX ORDER — MEPERIDINE HYDROCHLORIDE 50 MG/ML
12.5 INJECTION INTRAMUSCULAR; INTRAVENOUS; SUBCUTANEOUS EVERY 5 MIN PRN
Status: DISCONTINUED | OUTPATIENT
Start: 2019-05-08 | End: 2019-05-08 | Stop reason: HOSPADM

## 2019-05-08 RX ORDER — FENTANYL CITRATE 50 UG/ML
INJECTION, SOLUTION INTRAMUSCULAR; INTRAVENOUS PRN
Status: DISCONTINUED | OUTPATIENT
Start: 2019-05-08 | End: 2019-05-08 | Stop reason: SDUPTHER

## 2019-05-08 RX ORDER — HYDROCODONE BITARTRATE AND ACETAMINOPHEN 5; 325 MG/1; MG/1
2 TABLET ORAL PRN
Status: DISCONTINUED | OUTPATIENT
Start: 2019-05-08 | End: 2019-05-08 | Stop reason: HOSPADM

## 2019-05-08 RX ORDER — KETOROLAC TROMETHAMINE 30 MG/ML
INJECTION, SOLUTION INTRAMUSCULAR; INTRAVENOUS PRN
Status: DISCONTINUED | OUTPATIENT
Start: 2019-05-08 | End: 2019-05-08 | Stop reason: SDUPTHER

## 2019-05-08 RX ORDER — METOCLOPRAMIDE HYDROCHLORIDE 5 MG/ML
10 INJECTION INTRAMUSCULAR; INTRAVENOUS
Status: DISCONTINUED | OUTPATIENT
Start: 2019-05-08 | End: 2019-05-08 | Stop reason: HOSPADM

## 2019-05-08 RX ORDER — SODIUM CHLORIDE, SODIUM LACTATE, POTASSIUM CHLORIDE, CALCIUM CHLORIDE 600; 310; 30; 20 MG/100ML; MG/100ML; MG/100ML; MG/100ML
INJECTION, SOLUTION INTRAVENOUS CONTINUOUS
Status: DISCONTINUED | OUTPATIENT
Start: 2019-05-08 | End: 2019-05-08 | Stop reason: HOSPADM

## 2019-05-08 RX ORDER — BUPIVACAINE HYDROCHLORIDE AND EPINEPHRINE 2.5; 5 MG/ML; UG/ML
INJECTION, SOLUTION EPIDURAL; INFILTRATION; INTRACAUDAL; PERINEURAL PRN
Status: DISCONTINUED | OUTPATIENT
Start: 2019-05-08 | End: 2019-05-08 | Stop reason: ALTCHOICE

## 2019-05-08 RX ORDER — OXYCODONE AND ACETAMINOPHEN 10; 325 MG/1; MG/1
1 TABLET ORAL EVERY 4 HOURS PRN
Status: ON HOLD | COMMUNITY
End: 2019-05-08 | Stop reason: HOSPADM

## 2019-05-08 RX ORDER — ONDANSETRON 2 MG/ML
4 INJECTION INTRAMUSCULAR; INTRAVENOUS
Status: COMPLETED | OUTPATIENT
Start: 2019-05-08 | End: 2019-05-08

## 2019-05-08 RX ORDER — OXYCODONE HYDROCHLORIDE AND ACETAMINOPHEN 5; 325 MG/1; MG/1
2 TABLET ORAL ONCE
Status: COMPLETED | OUTPATIENT
Start: 2019-05-08 | End: 2019-05-08

## 2019-05-08 RX ORDER — FENTANYL CITRATE 50 UG/ML
50 INJECTION, SOLUTION INTRAMUSCULAR; INTRAVENOUS EVERY 5 MIN PRN
Status: DISCONTINUED | OUTPATIENT
Start: 2019-05-08 | End: 2019-05-08 | Stop reason: HOSPADM

## 2019-05-08 RX ORDER — ONDANSETRON 2 MG/ML
INJECTION INTRAMUSCULAR; INTRAVENOUS PRN
Status: DISCONTINUED | OUTPATIENT
Start: 2019-05-08 | End: 2019-05-08 | Stop reason: SDUPTHER

## 2019-05-08 RX ORDER — GLYCOPYRROLATE 1 MG/5 ML
SYRINGE (ML) INTRAVENOUS PRN
Status: DISCONTINUED | OUTPATIENT
Start: 2019-05-08 | End: 2019-05-08 | Stop reason: SDUPTHER

## 2019-05-08 RX ADMIN — LIDOCAINE HYDROCHLORIDE 50 MG: 10 INJECTION, SOLUTION EPIDURAL; INFILTRATION; INTRACAUDAL; PERINEURAL at 07:29

## 2019-05-08 RX ADMIN — ONDANSETRON 4 MG: 2 INJECTION INTRAMUSCULAR; INTRAVENOUS at 08:29

## 2019-05-08 RX ADMIN — Medication 2 G: at 07:41

## 2019-05-08 RX ADMIN — KETOROLAC TROMETHAMINE 30 MG: 30 INJECTION, SOLUTION INTRAMUSCULAR; INTRAVENOUS at 08:31

## 2019-05-08 RX ADMIN — MIDAZOLAM 2 MG: 1 INJECTION INTRAMUSCULAR; INTRAVENOUS at 07:29

## 2019-05-08 RX ADMIN — HYDROMORPHONE HYDROCHLORIDE 1 MG: 2 INJECTION, SOLUTION INTRAMUSCULAR; INTRAVENOUS; SUBCUTANEOUS at 08:40

## 2019-05-08 RX ADMIN — SODIUM CHLORIDE, POTASSIUM CHLORIDE, SODIUM LACTATE AND CALCIUM CHLORIDE: 600; 310; 30; 20 INJECTION, SOLUTION INTRAVENOUS at 07:23

## 2019-05-08 RX ADMIN — FENTANYL CITRATE 100 MCG: 50 INJECTION, SOLUTION INTRAMUSCULAR; INTRAVENOUS at 07:29

## 2019-05-08 RX ADMIN — TRANEXAMIC ACID 1000 MG: 1 INJECTION, SOLUTION INTRAVENOUS at 08:12

## 2019-05-08 RX ADMIN — HYDROMORPHONE HYDROCHLORIDE 1 MG: 2 INJECTION, SOLUTION INTRAMUSCULAR; INTRAVENOUS; SUBCUTANEOUS at 08:24

## 2019-05-08 RX ADMIN — HYDRALAZINE HYDROCHLORIDE 2 MG: 20 INJECTION INTRAMUSCULAR; INTRAVENOUS at 08:09

## 2019-05-08 RX ADMIN — HYDROMORPHONE HYDROCHLORIDE 0.5 MG: 1 INJECTION, SOLUTION INTRAMUSCULAR; INTRAVENOUS; SUBCUTANEOUS at 10:17

## 2019-05-08 RX ADMIN — ROCURONIUM BROMIDE 50 MG: 10 INJECTION INTRAVENOUS at 07:37

## 2019-05-08 RX ADMIN — ONDANSETRON 4 MG: 2 INJECTION INTRAMUSCULAR; INTRAVENOUS at 10:18

## 2019-05-08 RX ADMIN — OXYCODONE HYDROCHLORIDE AND ACETAMINOPHEN 2 TABLET: 5; 325 TABLET ORAL at 12:23

## 2019-05-08 RX ADMIN — Medication 160 MG: at 07:29

## 2019-05-08 RX ADMIN — HYDROMORPHONE HYDROCHLORIDE 0.5 MG: 1 INJECTION, SOLUTION INTRAMUSCULAR; INTRAVENOUS; SUBCUTANEOUS at 09:29

## 2019-05-08 RX ADMIN — FENTANYL CITRATE 100 MCG: 50 INJECTION, SOLUTION INTRAMUSCULAR; INTRAVENOUS at 07:49

## 2019-05-08 RX ADMIN — Medication 3 MG: at 08:32

## 2019-05-08 RX ADMIN — HYDROMORPHONE HYDROCHLORIDE 0.5 MG: 1 INJECTION, SOLUTION INTRAMUSCULAR; INTRAVENOUS; SUBCUTANEOUS at 09:12

## 2019-05-08 RX ADMIN — Medication 0.4 MG: at 08:32

## 2019-05-08 RX ADMIN — PROPOFOL 200 MG: 10 INJECTION, EMULSION INTRAVENOUS at 07:29

## 2019-05-08 RX ADMIN — FENTANYL CITRATE 50 MCG: 50 INJECTION, SOLUTION INTRAMUSCULAR; INTRAVENOUS at 07:56

## 2019-05-08 RX ADMIN — HYDRALAZINE HYDROCHLORIDE 3 MG: 20 INJECTION INTRAMUSCULAR; INTRAVENOUS at 08:17

## 2019-05-08 ASSESSMENT — PULMONARY FUNCTION TESTS
PIF_VALUE: 36
PIF_VALUE: 18
PIF_VALUE: 32
PIF_VALUE: 24
PIF_VALUE: 27
PIF_VALUE: 7
PIF_VALUE: 34
PIF_VALUE: 33
PIF_VALUE: 8
PIF_VALUE: 18
PIF_VALUE: 20
PIF_VALUE: 27
PIF_VALUE: 3
PIF_VALUE: 4
PIF_VALUE: 13
PIF_VALUE: 24
PIF_VALUE: 25
PIF_VALUE: 32
PIF_VALUE: 28
PIF_VALUE: 30
PIF_VALUE: 18
PIF_VALUE: 29
PIF_VALUE: 10
PIF_VALUE: 33
PIF_VALUE: 27
PIF_VALUE: 27
PIF_VALUE: 25
PIF_VALUE: 26
PIF_VALUE: 29
PIF_VALUE: 24
PIF_VALUE: 31
PIF_VALUE: 33
PIF_VALUE: 25
PIF_VALUE: 33
PIF_VALUE: 24
PIF_VALUE: 33
PIF_VALUE: 31
PIF_VALUE: 0
PIF_VALUE: 33
PIF_VALUE: 17
PIF_VALUE: 32
PIF_VALUE: 24
PIF_VALUE: 27
PIF_VALUE: 30
PIF_VALUE: 30
PIF_VALUE: 28
PIF_VALUE: 3
PIF_VALUE: 28
PIF_VALUE: 5
PIF_VALUE: 35
PIF_VALUE: 34
PIF_VALUE: 3
PIF_VALUE: 34
PIF_VALUE: 33
PIF_VALUE: 33
PIF_VALUE: 34
PIF_VALUE: 3
PIF_VALUE: 29
PIF_VALUE: 0
PIF_VALUE: 29
PIF_VALUE: 0
PIF_VALUE: 0
PIF_VALUE: 27
PIF_VALUE: 17
PIF_VALUE: 35
PIF_VALUE: 16
PIF_VALUE: 0
PIF_VALUE: 31
PIF_VALUE: 35
PIF_VALUE: 34
PIF_VALUE: 1
PIF_VALUE: 29
PIF_VALUE: 32
PIF_VALUE: 3
PIF_VALUE: 0
PIF_VALUE: 18
PIF_VALUE: 10
PIF_VALUE: 27
PIF_VALUE: 31
PIF_VALUE: 16
PIF_VALUE: 33
PIF_VALUE: 28
PIF_VALUE: 14
PIF_VALUE: 14
PIF_VALUE: 0
PIF_VALUE: 6
PIF_VALUE: 30
PIF_VALUE: 15
PIF_VALUE: 0
PIF_VALUE: 34
PIF_VALUE: 36
PIF_VALUE: 16
PIF_VALUE: 27

## 2019-05-08 ASSESSMENT — PAIN - FUNCTIONAL ASSESSMENT: PAIN_FUNCTIONAL_ASSESSMENT: 0-10

## 2019-05-08 ASSESSMENT — PAIN DESCRIPTION - PAIN TYPE
TYPE: SURGICAL PAIN

## 2019-05-08 ASSESSMENT — PAIN DESCRIPTION - PROGRESSION
CLINICAL_PROGRESSION: GRADUALLY WORSENING
CLINICAL_PROGRESSION: GRADUALLY IMPROVING
CLINICAL_PROGRESSION: NOT CHANGED
CLINICAL_PROGRESSION: NOT CHANGED

## 2019-05-08 ASSESSMENT — PAIN DESCRIPTION - ORIENTATION
ORIENTATION: LOWER

## 2019-05-08 ASSESSMENT — PAIN SCALES - GENERAL
PAINLEVEL_OUTOF10: 8
PAINLEVEL_OUTOF10: 6
PAINLEVEL_OUTOF10: 9
PAINLEVEL_OUTOF10: 8
PAINLEVEL_OUTOF10: 8
PAINLEVEL_OUTOF10: 9
PAINLEVEL_OUTOF10: 6
PAINLEVEL_OUTOF10: 7
PAINLEVEL_OUTOF10: 8

## 2019-05-08 ASSESSMENT — PAIN DESCRIPTION - FREQUENCY
FREQUENCY: CONTINUOUS

## 2019-05-08 ASSESSMENT — PAIN DESCRIPTION - ONSET
ONSET: AWAKENED FROM SLEEP

## 2019-05-08 ASSESSMENT — PAIN DESCRIPTION - LOCATION
LOCATION: BACK

## 2019-05-08 ASSESSMENT — PAIN DESCRIPTION - DESCRIPTORS
DESCRIPTORS: ACHING;CONSTANT;DULL
DESCRIPTORS: ACHING
DESCRIPTORS: ACHING
DESCRIPTORS: ACHING;DULL
DESCRIPTORS: SHOOTING
DESCRIPTORS: ACHING
DESCRIPTORS: ACHING;CONSTANT;DULL
DESCRIPTORS: ACHING
DESCRIPTORS: ACHING;CONSTANT;DULL

## 2019-05-08 ASSESSMENT — LIFESTYLE VARIABLES: SMOKING_STATUS: 1

## 2019-05-08 NOTE — ANESTHESIA PRE PROCEDURE
Department of Anesthesiology  Preprocedure Note       Name:  Candace Sewell   Age:  53 y.o.  :  1975                                          MRN:  [id number]         Date:  2019      Surgeon: Vu Eubanks):  Ruperto Singh MD    Procedure: LUMBAR  DISCECTOMY POSTERIOR L3 TO L4 (N/A Back)    Medications prior to admission:   Prior to Admission medications    Medication Sig Start Date End Date Taking? Authorizing Provider   oxyCODONE-acetaminophen (PERCOCET)  MG per tablet Take 1 tablet by mouth every 4 hours as needed for Pain.    Yes Historical Provider, MD   hydrochlorothiazide (HYDRODIURIL) 12.5 MG tablet Take 1 tablet by mouth daily 19  Yes KALE Blackburn CNP   polyethylene glycol [facility]) powder Take 17 g by mouth daily 1/2 cap with cup of fluid daily prn for constipation 19 Yes KALE Blackburn CNP   ibuprofen (ADVIL;MOTRIN) 800 MG tablet Take 1 tablet by mouth every 8 hours as needed for Pain 19   Xuan Luke DO       Current medications:    Current Facility-Administered Medications   Medication Dose Route Frequency Provider Last Rate Last Dose    lactated ringers infusion   Intravenous Continuous Ruperto Singh MD        ceFAZolin (ANCEF) 2 g in dextrose 5 % 50 mL IVPB  2 g Intravenous Once Ruperto Singh MD           Allergies:  No Known Allergies    Problem List:    Patient Active Problem List   Diagnosis Code    Essential hypertension I10    Chronic pain of left knee M25.562, G89.29    Tobacco abuse Z72.0    Edema of both lower legs due to peripheral venous insufficiency I87.2, R60.9       Past Medical History:        Diagnosis Date    GERD (gastroesophageal reflux disease)     Hypertension     Pain     lower back    Wears glasses        Past Surgical History:        Procedure Laterality Date    MRI UPPER EXTREMITY W OR WO CONTRAST  2019    lumbar spine without contrast       Social History:    Social History     Tobacco Use    Smoking status: Current Every Day Smoker     Packs/day: 0.50     Types: Cigarettes    Smokeless tobacco: Never Used   Substance Use Topics    Alcohol use: Yes     Alcohol/week: 3.6 oz     Types: 6 Cans of beer per week     Comment: weekly                                Ready to quit: Not Answered  Counseling given: Not Answered      Vital Signs (Current):   Vitals:    05/08/19 0615   BP: (!) 159/94   Pulse: 80   Resp: 20   Temp: 97.2 °F (36.2 °C)   TempSrc: Oral   SpO2: 98%   Weight: 250 lb (113.4 kg)   Height: 6' 6\" (1.981 m)                                              BP Readings from Last 3 Encounters:   05/08/19 (!) 159/94   04/25/19 (!) 147/93   04/29/19 122/82       NPO Status: Time of last liquid consumption: 2345                        Time of last solid consumption: 2345                        Date of last liquid consumption: 05/07/19                        Date of last solid food consumption: 05/07/19    BMI:   Wt Readings from Last 3 Encounters:   05/08/19 250 lb (113.4 kg)   04/25/19 250 lb 14.4 oz (113.8 kg)   04/29/19 247 lb 6.4 oz (112.2 kg)     Body mass index is 28.89 kg/m². CBC:   Lab Results   Component Value Date    WBC 6.6 04/25/2019    RBC 4.52 04/25/2019    HGB 15.1 04/25/2019    HCT 44.2 04/25/2019    MCV 97.9 04/25/2019    RDW 12.9 04/25/2019     04/25/2019       CMP:   Lab Results   Component Value Date     04/25/2019    K 4.5 04/25/2019    CL 98 04/25/2019    CO2 26 04/25/2019    BUN 16 04/25/2019    CREATININE 0.99 04/25/2019    GFRAA >60 04/25/2019    LABGLOM >60 04/25/2019    GLUCOSE 112 04/25/2019    PROT 8.2 04/12/2019    CALCIUM 10.1 04/25/2019    BILITOT 0.30 04/12/2019    ALKPHOS 65 04/12/2019    AST 24 04/12/2019    ALT 46 04/12/2019       POC Tests: No results for input(s): POCGLU, POCNA, POCK, POCCL, POCBUN, POCHEMO, POCHCT in the last 72 hours.     Coags: No results found for: PROTIME, INR, APTT    HCG (If Applicable): No results found for: PREGTESTUR, PREGSERUM, HCG, HCGQUANT     ABGs: No results found for: PHART, PO2ART, BNI1TDV, GDV0XWZ, BEART, B4OFSFDR     Type & Screen (If Applicable):  No results found for: [facility]    Anesthesia Evaluation  Patient summary reviewed and Nursing notes reviewed  Airway: Mallampati: II  TM distance: >3 FB   Neck ROM: full  Mouth opening: > = 3 FB Dental: normal exam         Pulmonary:normal exam    (+) current smoker                           Cardiovascular:    (+) hypertension:,       ECG reviewed  Rhythm: regular  Rate: normal                    Neuro/Psych:                ROS comment: Back pain and left leg radiculopathy  GI/Hepatic/Renal:   (+) GERD:,           Endo/Other:                     Abdominal:           Vascular:                                        Anesthesia Plan      general     ASA 2       Induction: intravenous. MIPS: Postoperative opioids intended and Prophylactic antiemetics administered. Anesthetic plan and risks discussed with patient. Plan discussed with CRNA.                   Mercedes Soares MD   5/8/2019

## 2019-05-08 NOTE — H&P
HISTORY and [facility]       NAME:  Russ Quinonez  MRN: [id number]   YOB: 1975   Date: 5/8/2019   Age: 53 y.o. Gender: male     H&P Update Note    H&P from 04/25/2019 reviewed and updated. Patient examined. INTERVAL HISTORY:     Patient is feeling well today, denies any fever/chills, chest pain, shortness of breath. No interval changes. No interval changes to past medical history, social history, family history. Review of systems as stated above and otherwise negative. PHYSICAL EXAM:     Vitals: BP (!) 159/94   Pulse 80   Temp 97.2 °F (36.2 °C) (Oral)   Resp 20   Ht 6' 6\" (1.981 m)   Wt 250 lb (113.4 kg)   SpO2 98%   BMI 28.89 kg/m²  Body mass index is 28.89 kg/m². Patient is alert and oriented, in no distress. Hypertensive. Heart rate and rhythm are regular. Lungs clear to auscultation bilaterally. Abdomen is soft, non tender. No pedal edema. No interval changes. I concur with the findings. Kady Campbell, APRN - CNP on 5/8/2019 at 6:47 AM    Claudell Pedro, PA-C   Physician Assistant   Internal Medicine   H&P   Signed   Date of Service:  4/25/2019  9:51 AM          Related encounter: Pre-Admission Testing Visit from 4/25/2019 in [address] [place] [address]                 Show:Clear all  [x]Manual[x]Template[x]Copied    Added by:  Ines Mcqueen PA-C      [][place] for details            HISTORY and [facility]         NAME:  Russ Quinonez  MRN: [id number]   YOB: 1975   Date: 4/25/2019   Age: 53 y.o. Gender: male      COMPLAINT ANDPRESENT HISTORY:      Russ Quinonez is a 53 y.o.  male, undergoing preadmission testing for posterior lumbar discectomy L3-4. Patient reports history of acute on chronic lower back pain for the past years, increased symptoms over past three weeks. Pain is described as an ache (10/10), with radiation to right lower extremity.  Patient admits to weakness/numbness in the right lower extremity. Pain is worse with activity, bending. Pain is improved mildly with pain medication. Patient denies previous history of surgery to the spine. Patient denies any incontinence of bowels/bladder, no saddle anesthesia. Patient is otherwise feeling well today, no recent fever/chills, chest pain, or shortness of breath. No history of venous thrombosis.      DIAGNOSTIC RESULTS   Radiology:   Narrative   EXAMINATION:   MRI OF THE LUMBAR SPINE WITHOUT CONTRAST, 4/19/2019 10:00 am       FINDINGS:   BONES/ALIGNMENT:       There is normal alignment of the spine. The vertebral body heights are   maintained.  No evidence of discitis or osteomyelitis.       SPINAL CORD:       The conus terminates normally.       SOFT TISSUES:       No significant paraspinous abnormalities.       L1-L2: No significant disc herniation. No significant facet degenerative   changes. No significant spinal canal stenosis or neural foraminal stenosis.       L2-L3: Minimal broad disc bulge. No significant spinal canal stenosis or   neural foraminal stenosis. Mild bilateral facet degenerative changes.       L3-L4: Moderate right central and foraminal disc extrusion with superior   migration behind L3 vertebral body. There may be partially sequestered disc   fragment. There is extension of this fragment into right neural foramina. Severe right neural foraminal stenosis and severe right lateral recess   stenosis.  No significant left lateral recess stenosis, or left neural   foraminal stenosis.  No significant spinal canal stenosis.       L4-L5: Posterior disc osteophyte complex with bilateral facet degenerative   changes. No significant spinal canal stenosis. Mild bilateral neural   foraminal stenosis.  Mild broad disc bulge.       L5-S1: Mild broad disc bulge. Superimposed small central and left central   disc protrusion with annular fissure.  No significant spinal canal stenosis,   or neural foraminal stenosis. Mild bilateral facet degenerative changes.       Impression   1. Moderate right central and foraminal disc extrusion with superior   migration behind L3 vertebral body. This extends into right neural foramina   at L3-L4, with possible sequestered disc fragment. Severe right lateral   recess stenosis and severe right neural foraminal stenosis at L3-L4.  No   significant spinal canal stenosis at L3-L4.       2. Small central and left central disc protrusion with annular fissure at   L5-S1.  No significant spinal canal stenosis or neural foraminal stenosis at   L5-S1.       3.  Additional multilevel degenerative changes of the lumbar spine as detailed   above.          PAST MEDICAL HISTORY           Past Medical History:   Diagnosis Date    GERD (gastroesophageal reflux disease)      Hypertension      Pain       lower back    Wears glasses        Pt denies any history of:  Diabetes Mellitus, Hyperlipidemia, Coronary Artery Disease, CVA/TIA, Asthma/COPD, Thyroid Disease, CKD, Cancer, Seizures, Hepatitis, Tuberculosis     SURGICAL HISTORY              Past Surgical History:   Procedure Laterality Date    MRI UPPER EXTREMITY W OR WO CONTRAST   04/19/2019     lumbar spine without contrast      FAMILY HISTORY              Family History   Problem Relation Age of Onset    Cancer Mother      High Blood Pressure Father      High Cholesterol Father      Heart Disease Father      Vision Loss Father        SOCIAL HISTORY        Social History            Socioeconomic History    Marital status:        Spouse name: None    Number of children: None    Years of education: None    Highest education level: None   Occupational History    None   Social Needs    Financial resource strain: None    Food insecurity:       Worry: None       Inability: None    Transportation needs:       Medical: None       Non-medical: None   Tobacco Use    Smoking status: Current Every Day Smoker       Packs/day: 0.50       Types: Cigarettes    Smokeless tobacco: Never Used   Substance and Sexual Activity    Alcohol use: Yes       Alcohol/week: 3.6 oz       Types: 6 Cans of beer per week       Comment: weekly    Drug use: No    Sexual activity: None   Lifestyle    Physical activity:       Days per week: None       Minutes per session: None    Stress: None   Relationships    Social connections:       Talks on phone: None       Gets together: None       Attends [employer] service: None       Active member of club or organization: None       Attends meetings of clubs or organizations: None       Relationship status: None    Intimate partner violence:       Fear of current or ex partner: None       Emotionally abused: None       Physically abused: None       Forced sexual activity: None   Other Topics Concern    None   Social History Narrative    None      REVIEW OF SYSTEMS       No Known Allergies            Current Outpatient Medications on File Prior to Encounter   Medication Sig Dispense Refill    oxyCODONE-acetaminophen (PERCOCET) 5-325 MG per tablet Take 1-2 tablets by mouth every 8 hours as needed for Pain for up to 10 days. 60 tablet 0    hydrochlorothiazide (HYDRODIURIL) 12.5 MG tablet Take 1 tablet by mouth daily 30 tablet 3    polyethylene glycol (MIRALAX) powder Take 17 g by mouth daily 1/2 cap with cup of fluid daily prn for constipation 289 g 1    ibuprofen (ADVIL;MOTRIN) 800 MG tablet Take 1 tablet by mouth every 8 hours as needed for Pain 30 tablet 0    cyclobenzaprine (FLEXERIL) 10 MG tablet Take 10 mg by mouth 3 times daily as needed for Muscle spasms          No current facility-administered medications on file prior to encounter.       Review of Systems   Constitutional: Positive for activity change. Negative for chills, fatigue and fever. HENT: Negative for congestion, ear discharge, ear pain, postnasal drip, sinus pressure, sinus pain and sore throat.     Respiratory: Negative for cough, chest tightness, shortness of breath and wheezing. Cardiovascular: Positive for leg swelling. Negative for chest pain and palpitations. Gastrointestinal: Negative for abdominal pain, blood in stool, constipation, diarrhea, nausea and vomiting. Genitourinary: Negative for difficulty urinating, dysuria, flank pain, frequency, hematuria and urgency. Musculoskeletal: Positive for back pain. Negative for arthralgias, joint swelling, neck pain and neck stiffness. Skin: Negative for color change, rash and wound. Neurological: Negative for dizziness, seizures, syncope, speech difficulty, weakness, light-headedness, numbness and headaches.      GENERAL PHYSICAL EXAM:      Vitals: BP (!) 147/93   Pulse 102   Temp 98.6 °F (37 °C) (Oral)   Resp 22   Ht 6' 6\" (1.981 m)   Wt 250 lb 14.4 oz (113.8 kg)   SpO2 99%   BMI 28.99 kg/m²  Body mass index is 28.99 kg/m².      GENERAL APPEARANCE: Kimber Pendleton is a 53 y.o.   male, not obese, nourished, conscious, alert. Does not appear to be in any distress or pain at this time. SKIN:  Normal temperature, turgor and texture. No cyanosis or jaundice. HEAD:  Normocephalic, atraumatic. EYES:  Pupils equal, reactive to light andaccomodation. Conjunctiva clear. EOMs intact bilaterally. THROAT:  Mucous membranes moist. No tonsillar erythema or exudates. NECK:  No stiffness, trachea central.  No palpable masses. CHEST:  Symmetrical and equal on expansion. HEART:  Hypertensive. Regular rate, rhythm. No murmurs. LUNGS:  Equal on expansion. Clear to auscultation with no adventitious sounds. ABDOMEN:  Normoactive bowel sounds. Soft on palpation. No localized tenderness, guarding or rigidity. No palpable organomegaly.               LYMPHATICS:  No palpable cervical lymphadenopathy.     LOCOMOTOR, BACK AND SPINE:  Tenderness to palpation lumbar spine, pain with straight leg raise. No flank tenderness. EXTREMITIES:  Symmetrical with 2+ pedal edema. No discoloration or ulcerations. No warmth, tenderness, erythema noted in lower legs bilaterally.       NEUROLOGIC:  The patient is conscious, alert, oriented. No focal sensory or motor deficits. Cranial nerve exam reveals no deficits.                                                                        PROVISIONAL DIAGNOSES / SURGERY:       Disc Herniation, L3-4  Lumbar Discectomy, L3-4         Dilcia Townsend PA-C on4/25/2019 at 10:14 AM                   Cosigned by: Reino Hatchet, MD at 4/25/2019 12:20 PM   Revision History                        Routing History

## 2019-05-08 NOTE — ANESTHESIA POSTPROCEDURE EVALUATION
Department of Anesthesiology  Postprocedure Note    Patient: Frandy Rdz  MRN: [id number]  YOB: 1975  Date of evaluation: 5/8/2019  Time:  11:56 AM     Procedure Summary     Date:  05/08/19 Room / Location:  [address] / [address]    Anesthesia Start:  0723 Anesthesia Stop:  Vidhya     Procedure:  LUMBAR  DISCECTOMY POSTERIOR L3 TO L4 (N/A Back) Diagnosis:  (L3 TO L4 DISC HERNIATION  UPDATE ON ADMIT PER ANES (SURGERY CANC ON 5/1))    Surgeon:  Jeannie Duran MD Responsible Provider:  Mercedes Soares MD    Anesthesia Type:  general ASA Status:  2          Anesthesia Type: general    Amberly Phase I: Amberly Score: 10    Amberly Phase II:      Last vitals: Reviewed and per EMR flowsheets.        Anesthesia Post Evaluation    Comments: POST- ANESTHESIA EVALUATION       Pt Name: Frandy Rdz  MRN: [id number]  YOB: 1975  Date of evaluation: 5/8/2019  Time:  11:56 AM      BP (!) 145/79   Pulse 81   Temp 97.5 °F (36.4 °C)   Resp 16   Ht 6' 6\" (1.981 m)   Wt 250 lb (113.4 kg)   SpO2 95%   BMI 28.89 kg/m²      Consciousness Level  Awake  Cardiopulmonary Status  Stable  Pain Adequately Treated YES  Nausea / Vomiting  NO  Adequate Hydration  YES  Anesthesia Related Complications NONE      Electronically signed by Mercedes Soares MD on 5/8/2019 at 11:56 AM

## 2019-05-08 NOTE — BRIEF OP NOTE
Brief Postoperative Note  ______________________________________________________________    Patient: Davion Chávez  YOB: 1975  MRN: [id number]  Date of Procedure: 5/8/2019    Pre-Op Diagnosis: L3 TO L4 DISC HERNIATION  UPDATE ON ADMIT PER TISH (SURGERY CANC ON 5/1)    Post-Op Diagnosis: Same       Procedure(s):  LUMBAR  DISCECTOMY POSTERIOR L3 TO L4    Anesthesia: General    Surgeon(s):  Yohan Dow MD    Assistant: na    Estimated Blood Loss (mL): less than 50     Complications: None    Specimens:   * No specimens in log *    Implants:  * No implants in log *      Drains: * No LDAs found *    Findings: see dictation    Yohan Dow MD  Date: 5/8/2019  Time: 8:46 AM

## 2019-05-09 ENCOUNTER — TELEPHONE (OUTPATIENT)
Dept: ADMINISTRATIVE | Age: 44
End: 2019-05-09

## 2019-05-09 NOTE — TELEPHONE ENCOUNTER
Pt wife called stating dr Kit Shah told her after the pt's sx on 5/8 to [place] the pt for an appt on 5/10, per luci Mercado in [place] and to offer pt 5/13, or [place], I told pt wife dr Kit Sahh not in office and before I was able to offer Monday she hung up on me.

## 2019-05-09 NOTE — OP NOTE
[address]                 [address] [facility], [address]                                OPERATIVE REPORT    PATIENT NAME: Chucky Segovia                     :        1975  MED REC NO:   [id number]                              ROOM:  ACCOUNT NO:   [de-identified]                           ADMIT DATE: 2019  PROVIDER:     Mer Abreu    DATE OF PROCEDURE:  2019    PREOPERATIVE DIAGNOSIS:  L3-4 right paracentral disc herniation. POSTOPERATIVE DIAGNOSIS:  L3-4 right paracentral disc herniation. OPERATING SURGEON:  Mer Abreu MD    ANESTHESIA:  General.    PROCEDURE PERFORMED:  L3-4 diskectomy, decompression with fluoroscopic  assistance. FINDINGS:  The patient with moderately large disc fragments, cephalad  migration right L3-4. PROCEDURE IN DETAIL:  The patient was taken to the operating room,  placed under general anesthesia, transferred to the [facility]  table, checked for padding and position. Back was prepped and draped in  the usual sterile fashion. With the aid of fluoroscopic assistance, husam-incisional area was  injected with local anesthetic. Midline incision made, carried down through skin and subcutaneous  tissue. Paraspinal musculature was elevated out to the lateral edge of  the L3-4 facet joint. Fluoroscopy was again utilized to verify position. Moderately large laminotomy was performed, as we needed to get  significantly cephalad. This was done predominantly with a high-speed  bur and then completed with a #3 [place]. Ligamentum flavum was taken  down. Disc was identified. Significant time was then spent taking care of  epidural veins. Lateral fluoroscopic image was then again obtained. Annulotomy was made. Some smaller disc fragments were removed. The defect in the lift-off area of the posterior longitudinal ligament  on the right side was explored.     Lateral fluoroscopic image was then again obtained. We had obtained a couple of small fragments but it did not quite seem as  if we had the obtained the major fragment. At this juncture, when we  re-exposed the are, the large fragment free edge had actually managed to  migrate into the operative field. We extracted this disc fragment. Anteriorly, the posterior longitudinal ligament was again explored for  the third time with a right-angle nerve hook and a [place], no further  fragments were encountered. The fragment that we did remove and that took a while to present itself,  was actually moderately large, concordant with radiographic imaging. At this juncture, it was felt the patient was acceptably decompressed. Small fat graft was harvested, placed over interlaminar defect. Deep  fascia reapproximated #2 Vicryl sutures, subcuticular with 2-0 Vicryl  followed by Aquacel dressing. The patient was awakened from Anesthesia,  taken to recovery room in stable condition. COMPLICATIONS RAISED DURING OPERATION:  None noted. BLOOD LOSS:  Less than 50 mL. FLUID REPLACEMENT:  Less than 2 liters.         KURTIS Villasenor    D: 05/08/2019 9:01:07       T: 05/08/2019 9:08:32     [email or web address]  Job#: [id number]     Doc#: [id number]    CC:

## 2019-05-10 ENCOUNTER — OFFICE VISIT (OUTPATIENT)
Dept: ORTHOPEDIC SURGERY | Age: 44
End: 2019-05-10

## 2019-05-10 VITALS
SYSTOLIC BLOOD PRESSURE: 136 MMHG | BODY MASS INDEX: 29.16 KG/M2 | WEIGHT: 247 LBS | DIASTOLIC BLOOD PRESSURE: 77 MMHG | HEART RATE: 73 BPM | HEIGHT: 77 IN

## 2019-05-10 DIAGNOSIS — T14.8XXA HEMATOMA: ICD-10-CM

## 2019-05-10 DIAGNOSIS — M54.10 RADICULAR PAIN OF RIGHT LOWER EXTREMITY: Primary | ICD-10-CM

## 2019-05-10 DIAGNOSIS — M51.26 LUMBAR DISC HERNIATION: ICD-10-CM

## 2019-05-10 PROCEDURE — 99024 POSTOP FOLLOW-UP VISIT: CPT | Performed by: ORTHOPAEDIC SURGERY

## 2019-05-12 NOTE — PROGRESS NOTES
Subjective:      Patient ID: Yany Napier is a 53 y.o. male. HPI  HPI  Follow-up lumbar discectomy     Patient immediately postoperatively developed a hematoma     Patient reports his low back andradicular leg pain is substantially improved  Review of Systems     Objective:   Physical Exam.  Incisional areas reviewed much less swelling     Incision healing well     Patient reports substantially improved back and radicular leg pain with respect to preop   Assessment:        Encounter Diagnoses   Name Primary?     Lumbar disc herniation Yes    Lumbar radicular pain      Lumbar spine pain      Chronic midline low back pain with left-sided sciatica      Chronic pain of right knee        Patient substantially improved with respect to preop nevertheless patient with incapacitating back and radicular leg pain                                   Review of Systems    Objective:   Physical Exam    Assessment:             Plan:      Follow-up        Ching Flores MD

## 2019-05-15 LAB
EKG ATRIAL RATE: 99 BPM
EKG P AXIS: 26 DEGREES
EKG P-R INTERVAL: 156 MS
EKG Q-T INTERVAL: 354 MS
EKG QRS DURATION: 82 MS
EKG QTC CALCULATION (BAZETT): 454 MS
EKG R AXIS: -3 DEGREES
EKG T AXIS: 37 DEGREES
EKG VENTRICULAR RATE: 99 BPM

## 2019-05-16 ENCOUNTER — OFFICE VISIT (OUTPATIENT)
Dept: ORTHOPEDIC SURGERY | Age: 44
End: 2019-05-16

## 2019-05-16 DIAGNOSIS — M51.26 LUMBAR DISC HERNIATION: Primary | ICD-10-CM

## 2019-05-16 PROCEDURE — 99024 POSTOP FOLLOW-UP VISIT: CPT | Performed by: ORTHOPAEDIC SURGERY

## 2019-05-16 NOTE — PROGRESS NOTES
Subjective:      Patient ID: Kimber Pendleton is a 53 y.o. male. HPI  S post lumbar discectomy    Patient is doing extraordinarily well no leg pain no significant back pain  Review of Systems    Objective:   Physical Exam    First-time patient walked in the clinic with no assistive devices now wheelchair stands walks comfortably        Back incision still shows a little husam-incisional swelling incision healing well. Assessment:      Encounter Diagnosis   Name Primary? 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

## 2019-06-13 ENCOUNTER — OFFICE VISIT (OUTPATIENT)
Dept: ORTHOPEDIC SURGERY | Age: 44
End: 2019-06-13

## 2019-06-13 DIAGNOSIS — M54.10 RADICULAR PAIN OF RIGHT LOWER EXTREMITY: ICD-10-CM

## 2019-06-13 DIAGNOSIS — M54.5 ACUTE LOW BACK PAIN, UNSPECIFIED BACK PAIN LATERALITY, WITH SCIATICA PRESENCE UNSPECIFIED: ICD-10-CM

## 2019-06-13 DIAGNOSIS — M25.551 RIGHT HIP PAIN: ICD-10-CM

## 2019-06-13 DIAGNOSIS — M51.26 LUMBAR DISC HERNIATION: Primary | ICD-10-CM

## 2019-06-13 PROCEDURE — 99024 POSTOP FOLLOW-UP VISIT: CPT | Performed by: ORTHOPAEDIC SURGERY

## 2019-12-04 ENCOUNTER — HOSPITAL ENCOUNTER (OUTPATIENT)
Age: 44
Setting detail: SPECIMEN
Discharge: HOME | End: 2019-12-04
Payer: COMMERCIAL

## 2019-12-04 DIAGNOSIS — I10 ESSENTIAL HYPERTENSION: ICD-10-CM

## 2019-12-04 DIAGNOSIS — Z12.5 SCREENING PSA (PROSTATE SPECIFIC ANTIGEN): ICD-10-CM

## 2019-12-04 LAB
ABSOLUTE EOS #: 0.15 K/UL (ref 0–0.44)
ABSOLUTE IMMATURE GRANULOCYTE: <0.03 K/UL (ref 0–0.3)
ABSOLUTE LYMPH #: 3.15 K/UL (ref 1.1–3.7)
ABSOLUTE MONO #: 0.64 K/UL (ref 0.1–1.2)
ALBUMIN SERPL-MCNC: 4.6 G/DL (ref 3.5–5.2)
ALBUMIN/GLOBULIN RATIO: 1.3 (ref 1–2.5)
ALP BLD-CCNC: 58 U/L (ref 40–129)
ALT SERPL-CCNC: 55 U/L (ref 5–41)
ANION GAP SERPL CALCULATED.3IONS-SCNC: 13 MMOL/L (ref 9–17)
AST SERPL-CCNC: 34 U/L
BASOPHILS # BLD: 1 % (ref 0–2)
BASOPHILS ABSOLUTE: 0.05 K/UL (ref 0–0.2)
BILIRUB SERPL-MCNC: 0.4 MG/DL (ref 0.3–1.2)
BUN BLDV-MCNC: 16 MG/DL (ref 6–20)
BUN/CREAT BLD: ABNORMAL (ref 9–20)
CALCIUM SERPL-MCNC: 9.9 MG/DL (ref 8.6–10.4)
CHLORIDE BLD-SCNC: 105 MMOL/L (ref 98–107)
CHOLESTEROL/HDL RATIO: 6.1
CHOLESTEROL: 252 MG/DL
CO2: 24 MMOL/L (ref 20–31)
CREAT SERPL-MCNC: 1.16 MG/DL (ref 0.7–1.2)
DIFFERENTIAL TYPE: ABNORMAL
EOSINOPHILS RELATIVE PERCENT: 2 % (ref 1–4)
GFR AFRICAN AMERICAN: >60 ML/MIN
GFR NON-AFRICAN AMERICAN: >60 ML/MIN
GFR SERPL CREATININE-BSD FRML MDRD: ABNORMAL ML/MIN/{1.73_M2}
GFR SERPL CREATININE-BSD FRML MDRD: ABNORMAL ML/MIN/{1.73_M2}
GLUCOSE BLD-MCNC: 87 MG/DL (ref 70–99)
HCT VFR BLD CALC: 47.9 % (ref 40.7–50.3)
HDLC SERPL-MCNC: 41 MG/DL
HEMOGLOBIN: 15.3 G/DL (ref 13–17)
IMMATURE GRANULOCYTES: 0 %
LDL CHOLESTEROL: 176 MG/DL (ref 0–130)
LYMPHOCYTES # BLD: 46 % (ref 24–43)
MCH RBC QN AUTO: 33.4 PG (ref 25.2–33.5)
MCHC RBC AUTO-ENTMCNC: 31.9 G/DL (ref 28.4–34.8)
MCV RBC AUTO: 104.6 FL (ref 82.6–102.9)
MONOCYTES # BLD: 9 % (ref 3–12)
NRBC AUTOMATED: 0 PER 100 WBC
PDW BLD-RTO: 13.2 % (ref 11.8–14.4)
PLATELET # BLD: 227 K/UL (ref 138–453)
PLATELET ESTIMATE: ABNORMAL
PMV BLD AUTO: 12.5 FL (ref 8.1–13.5)
POTASSIUM SERPL-SCNC: 4.4 MMOL/L (ref 3.7–5.3)
PROSTATE SPECIFIC ANTIGEN: 1.5 UG/L
RBC # BLD: 4.58 M/UL (ref 4.21–5.77)
RBC # BLD: ABNORMAL 10*6/UL
SEG NEUTROPHILS: 42 % (ref 36–65)
SEGMENTED NEUTROPHILS ABSOLUTE COUNT: 2.86 K/UL (ref 1.5–8.1)
SODIUM BLD-SCNC: 142 MMOL/L (ref 135–144)
TOTAL PROTEIN: 8.2 G/DL (ref 6.4–8.3)
TRIGL SERPL-MCNC: 177 MG/DL
VLDLC SERPL CALC-MCNC: ABNORMAL MG/DL (ref 1–30)
WBC # BLD: 6.9 K/UL (ref 3.5–11.3)
WBC # BLD: ABNORMAL 10*3/UL

## 2021-03-25 ENCOUNTER — HOSPITAL ENCOUNTER (EMERGENCY)
Age: 46
Discharge: HOME | End: 2021-03-25
Attending: EMERGENCY MEDICINE
Payer: COMMERCIAL

## 2021-03-25 VITALS
HEART RATE: 76 BPM | TEMPERATURE: 98.1 F | RESPIRATION RATE: 18 BRPM | HEIGHT: 77 IN | DIASTOLIC BLOOD PRESSURE: 102 MMHG | WEIGHT: 265 LBS | SYSTOLIC BLOOD PRESSURE: 163 MMHG | BODY MASS INDEX: 31.29 KG/M2 | OXYGEN SATURATION: 96 %

## 2021-03-25 DIAGNOSIS — I10 HYPERTENSION, UNSPECIFIED TYPE: ICD-10-CM

## 2021-03-25 DIAGNOSIS — L72.9 SCALP CYST: Primary | ICD-10-CM

## 2021-03-25 PROCEDURE — 99283 EMERGENCY DEPT VISIT LOW MDM: CPT

## 2021-03-25 PROCEDURE — 6370000000 HC RX 637 (ALT 250 FOR IP): Performed by: PHYSICIAN ASSISTANT

## 2021-03-25 RX ORDER — CEPHALEXIN 500 MG/1
500 CAPSULE ORAL 4 TIMES DAILY
Qty: 28 CAPSULE | Refills: 0 | Status: SHIPPED | OUTPATIENT
Start: 2021-03-25 | End: 2021-04-01

## 2021-03-25 RX ORDER — HYDROCODONE BITARTRATE AND ACETAMINOPHEN 5; 325 MG/1; MG/1
1 TABLET ORAL ONCE
Status: COMPLETED | OUTPATIENT
Start: 2021-03-25 | End: 2021-03-25

## 2021-03-25 RX ORDER — HYDROCODONE BITARTRATE AND ACETAMINOPHEN 5; 325 MG/1; MG/1
1 TABLET ORAL EVERY 6 HOURS PRN
Qty: 12 TABLET | Refills: 0 | Status: SHIPPED | OUTPATIENT
Start: 2021-03-25 | End: 2021-03-28

## 2021-03-25 RX ADMIN — HYDROCODONE BITARTRATE AND ACETAMINOPHEN 1 TABLET: 5; 325 TABLET ORAL at 12:08

## 2021-03-25 ASSESSMENT — ENCOUNTER SYMPTOMS
PHOTOPHOBIA: 0
RHINORRHEA: 0
COUGH: 0
FACIAL SWELLING: 0
NAUSEA: 0
SINUS PAIN: 0
SHORTNESS OF BREATH: 0
VOMITING: 0
SORE THROAT: 0
ABDOMINAL PAIN: 0

## 2021-03-25 ASSESSMENT — PAIN DESCRIPTION - LOCATION: LOCATION: HEAD

## 2021-03-25 NOTE — ED PROVIDER NOTES
[address] [facility] ED  [address] hospitals [address]  Phone: [phone]  Fax: [phone]        Pt Name: Kwasi Stokes  MRN: [id number]  [place] 1975  Date of evaluation: 3/25/21    [address] [place] [address]       Chief Complaint   Patient presents with    Wound Infection     Has hx of lumps that burst on head, one burst 2 days ago and has began having swelling and pain since yesterday, tighness and throbbing on skin       HISTORY OF PRESENT ILLNESS (Location/Symptom, Timing/Onset, Context/Setting, Quality, Duration, Modifying Factors, Severity)      Kwasi Stokes is a 50 y.o. male with no pertinent PMH who presents to the ED via private auto with pain and swelling. Patient reports that he has had lumps on his scalp fluctuating in size for \"a while\" and they are mostly in the crown of his head. Partner is bedside and she says the smaller ones often grow and burst on their own, but the larger one usually does not change much. Patient states that this morning swelling and pain to his right occiput and noted another large painful lump. He says that he does not feel like this was here the day before and is concerned that the ones that the top leaked down below and now he is fluid elsewhere in his head. Denies any known trauma or injury. They report that he called their primary care provider and they have an appointment at 4 PM today, but patient says that the pain is very severe. Denies taking any medication for his discomfort. Denies exacerbating or alleviating factors other than palpation. Denies any fever, chills, recent vision changes, dizziness, lightheadedness, nausea, vomiting, extremity weakness, purulent drainage, or any other concerns at this time. Of note he has not seen dermatology. PAST MEDICAL / SURGICAL / SOCIAL / FAMILY HISTORY     PMH:  has a past medical history of GERD (gastroesophageal reflux disease), Hypertension, Pain, and Wears glasses.   Surgical History: has a past surgical history that includes mri upper extremity w or wo contrast (2019) and Lumbar spine surgery (N/A, 2019). Social History:  reports that he has been smoking cigarettes. He has been smoking about 0.50 packs per day. He has never used smokeless tobacco. He reports current alcohol use of about 6.0 standard drinks of alcohol per week. He reports that he does not use drugs. Family History: He indicated that his mother is . He indicated that his father is . family history includes Cancer in his mother; Heart Disease in his father; High Blood Pressure in his father; High Cholesterol in his father; Vision Loss in his father. Psychiatric History: None    Allergies: Patient has no known allergies. Home Medications:   Prior to Admission medications    Medication Sig Start Date End Date Taking? Authorizing Provider   HYDROcodone-acetaminophen (NORCO) 5-325 MG per tablet Take 1 tablet by mouth every 6 hours as needed for Pain for up to 3 days. Intended supply: 3 days. Take lowest dose possible to manage pain 3/25/21 3/28/21 Yes Julieth Veliz PA-C   cephALEXin (KEFLEX) 500 MG capsule Take 1 capsule by mouth 4 times daily for 7 days 3/25/21 4/1/21 Yes Julieth Veliz PA-C   varenicline (CHANTIX STARTING MONTH ) 0.5 MG X 11 & 1 MG X 42 tablet Take by mouth.  2/10/21   KALE Jacobs CNP   lisinopril (PRINIVIL;ZESTRIL) 20 MG tablet Take 1 tablet by mouth daily 2/10/21   KALE Jacobs CNP   tiZANidine (ZANAFLEX) 2 MG tablet Take 1-2 tablets by mouth nightly as needed (back pain, spasms) 2/10/21   AKLE Jacobs CNP   atorvastatin (LIPITOR) 40 MG tablet Take 1 tablet by mouth daily 19   KALE Jacobs CNP   hydrochlorothiazide (HYDRODIURIL) 12.5 MG tablet Take 1 tablet by mouth daily 19   KALE Jacobs CNP       REVIEW OF SYSTEMS  (2-9 systems for level 4, 10 ormore for level 5)      Review of Systems   Constitutional: Negative for chills and fever.   HENT: Negative for congestion, dental problem, facial swelling, rhinorrhea, sinus pain, sore throat and tinnitus. Eyes: Negative for photophobia and visual disturbance. Respiratory: Negative for cough and shortness of breath. Cardiovascular: Negative for chest pain. Gastrointestinal: Negative for abdominal pain, nausea and vomiting. Musculoskeletal: Negative for gait problem, neck pain and neck stiffness. Skin: Negative for rash. Allergic/Immunologic: Negative for immunocompromised state. Neurological: Positive for headaches (Head pain associated with lumps). Negative for dizziness, seizures, syncope, weakness and light-headedness. Psychiatric/Behavioral: Negative for agitation. PHYSICAL EXAM  (up to 7 for level 4, 8 or more for level 5)      INITIAL VITALS:  height is 6' 5\" (1.956 m) and weight is 120.2 kg (265 lb). His oral temperature is 98.1 °F (36.7 °C). His blood pressure is 163/102 (abnormal) and his pulse is 76. His respiration is 18 and oxygen saturation is 96%. Vital signs reviewed. Physical Exam    General:  Alert, cooperative, well-groomed, well-nourished, appears stated age, and is in no acute distress. Head:  There is a 5 cm x 5 cm fluctuant area that is nontender to palpation to the crown of the head without erythema or warmth. There are several small nodules noted just inferior to that with very mild amount of erythema and these are not tender to palpation. There is no fluctuance or drainage. There is also a 5 x 5 cm minimally fluctuant area that is tender to palpation to the right occiput without overlying erythema. Eyes:  Sclerae/conjunctivae clear without injection, pallor, or icterus. Corneas clear without opacities. PERRL EOM's intact. ENT: Ears and nose are all without obvious masses lesion or deformity. Oropharynx is clear, without erythema. Airway patent. Uvula midline. Neck: Supple and symmetrical. Trachea midline. No adenopathy.  No jugular venous distention. Lungs:   No respiratory distress. Clear to auscultation bilaterally. No wheezes, rhonchi, or rales. Heart:  Regular rate. Regular rhythm. No murmurs, rubs, or gallops. Abdomen:   Normoactive bowel sounds. Soft, nontender, nondistended without guarding or rebound. No palpable masses. No CVA tenderness. Extremities: Warm and dry without erythema or edema. Skin: Soft, good turgor, and well-hydrated. No obvious rashes. See head. Neurologic: GCS is 15 and no focal deficits are appreciated. Normal gait. Grossly normal motor and sensation. CN II-XII intact. Speech clear. Psychiatric: Normal mood and affect. Normal behavior. Coherent thought process. DIFFERENTIAL DIAGNOSIS / MDM     Patient presents the emergency department for what appears to be sebaceous cysts. One of which is causing him pain. Vital signs demonstrate hypertension at 164/100 and are otherwise unremarkable. Physical exam demonstrates a well-appearing, nontoxic male in no acute distress. He is seated comfortably in bed. Physical exam described as above. No obvious abscess or cellulitis. Neurologically intact. Do not believe that I&D is warranted. Will Rx pain meds and antibiotics and advise to follow-up with derm. Discussed concerning signs symptoms that warrant emergent return patient verbalized understanding. This patient was seen by the attending physician and they agreed with the assessment and plan. PLAN (LABS / IMAGING / EKG):  No orders of the defined types were placed in this encounter. MEDICATIONS ORDERED:  Orders Placed This Encounter   Medications    HYDROcodone-acetaminophen (NORCO) 5-325 MG per tablet 1 tablet    HYDROcodone-acetaminophen (NORCO) 5-325 MG per tablet     Sig: Take 1 tablet by mouth every 6 hours as needed for Pain for up to 3 days. Intended supply: 3 days.  Take lowest dose possible to manage pain     Dispense:  12 tablet     Refill:  0    cephALEXin (KEFLEX) 500 MG capsule     Sig: Take 1 capsule by mouth 4 times daily for 7 days     Dispense:  28 capsule     Refill:  0       Controlled Substances Monitoring:     DIAGNOSTIC RESULTS     LABS:  No results found for this visit on 03/25/21. EMERGENCY DEPARTMENT COURSE           Vitals:    Vitals:    03/25/21 1129 03/25/21 1200   BP: (!) 164/100 (!) 163/102   Pulse: 80 76   Resp: 16 18   Temp: 98.1 °F (36.7 °C)    TempSrc: Oral    SpO2: 97% 96%   Weight: 120.2 kg (265 lb)    Height: 6' 5\" (1.956 m)      -------------------------  BP: (!) 163/102, Temp: 98.1 °F (36.7 °C), Pulse: 76, Resp: 18      RE-EVALUATION:  No re-evaluation was necessary as patient was discharged home after first impression from myself and the attending. Asymtomatic HTN and will follow-up with PCP. CONSULTS:  None    PROCEDURES:  None    FINAL IMPRESSION      1. Scalp cyst    2. Hypertension, unspecified type          DISPOSITION / PLAN     CONDITION ON DISPOSITION:   Good / Stable for discharge. PATIENT REFERRED TO:  Esther Cardenas, APRN - CNP  [address] [address] [phone]    Schedule an appointment as soon as possible for a visit   To follow-up regarding the cysts as well as your blood pressure. The dermatologist    Schedule an appointment as soon as possible for a visit         DISCHARGE MEDICATIONS:  New Prescriptions    CEPHALEXIN (KEFLEX) 500 MG CAPSULE    Take 1 capsule by mouth 4 times daily for 7 days    HYDROCODONE-ACETAMINOPHEN (NORCO) 5-325 MG PER TABLET    Take 1 tablet by mouth every 6 hours as needed for Pain for up to 3 days. Intended supply: 3 days.  Take lowest dose possible to manage pain       Tony Gross PA-C   Emergency Medicine Physician Assistant    (Please note that portions of this note were completed with a voice recognition program.  Efforts were made to edit the dictations but occasionally words aremis-transcribed.)       Tony Gross PA-C  03/25/21 [id number]

## 2021-03-25 NOTE — ED NOTES
Patient provided with discharge instructions, prescriptions, and follow up information. Verbalized understanding. No IV access to discontinue. A&OX3. Steady gait noted at discharge. Wheelchair declined by patient.       Kumar Gruber RN  03/25/21 2203

## 2021-03-25 NOTE — ED PROVIDER NOTES
Emergency Department         COMPLAINT       Chief Complaint   Patient presents with    Wound Infection     Has hx of lumps that burst on head, one burst 2 days ago and has began having swelling and pain since yesterday, tighness and throbbing on skin      PHYSICAL EXAM      ED Triage Vitals [03/25/21 1129]   BP Temp Temp Source Pulse Resp SpO2 Height Weight   (!) 164/100 98.1 °F (36.7 °C) Oral 80 16 97 % 6' 5\" (1.956 m) 265 lb (120.2 kg)         Constitutional: Alert, oriented x3, nontoxic, answering questions appropriately, acting properly for age, in no acute distress   HEENT: Extraocular muscles intact, mucus membranes moist, fluctuant cystic area on the top of the head in the posterior aspect of the scalp. No erythema., Pupils equal, round, reactive to light,   Neck: Trachea midline   Cardiovascular: Regular rhythm and rate no S3, S4, or murmurs   Respiratory: Clear to auscultation bilaterally no wheezes, rhonchi, rales, no respiratory distress no tachypnea no retractions no hypoxia  Gastrointestinal: Soft, nontender, nondistended, positive bowel sounds. No rebound, rigidity, or guarding. Musculoskeletal: No extremity pain or swelling   Neurologic: Moving all 4 extremities without difficulty there are no gross focal neurologic deficits   Skin: Warm and dry       Physical Exam  DIAGNOSTIC RESULTS     EKG: All EKG's are interpreted by the Emergency Department Physician who either signs or Co-signs this chart in the absence of a cardiologist.    Not indicated unless otherwise documented above or in the midlevel documentation    LABS:  No results found for this visit on 03/25/21.     Not indicated unless otherwise documented above or in the midlevel documentation    RADIOLOGY:   I reviewedthe radiologist interpretations:  No orders to display       Not indicated unless otherwise documented above or in the midlevel documentation    EMERGENCY DEPARTMENT COURSE:       [address] COMMENTS:    Waxing and waning fluctuant areas on the head 1 area 2 days ago burst open and was draining purulent material.  No fevers. He has an area on the top of his head that has been causing him problems for the past 4 years. Will place on antibiotics and recommend following up with dermatologist.  His significant other has a dermatologist appointment tomorrow morning    Faculty Attestation    I performed a history and physical examination of the patient and discussed management with the mid level provideer. I reviewed the mid level provider's note and agree with the documented findings and plan of care. Any areas of disagreement are noted on the chart. I was personally present for the key portions of any procedures. I have documented in the chart those procedures where I was not present during the key portions. I have reviewed the emergency nurses triage note. I agree with the chief complaint, past medical history, past surgical history, allergies, medications, social and family history as documented unless otherwise noted below. Documentation of the HPI, Physical Exam and Medical Decision Making performed by medical students or scribes is based on my personal performance of the HPI, PE and MDM. For Physician Assistant/ Nurse Practitioner cases/documentation I have personally evaluated this patient and have completed at least one if not all key elements of the E/M (history, physical exam, and MDM). Additional findings are as noted.        Rosamaria Fofana DO  03/25/21 3988

## 2024-06-21 ENCOUNTER — TELEPHONE (OUTPATIENT)
Dept: FAMILY MEDICINE CLINIC | Age: 49
End: 2024-06-21

## 2024-06-21 NOTE — TELEPHONE ENCOUNTER
----- Message from Yolanda Benjamin Pino sent at 6/21/2024  2:52 PM EDT -----  Regarding: [facility] Appointment Request  [facility] Appointment Request    Patient needs appointment for [facility] Appointment Type: New Patient.    Reason for Appointment Request: Available appointments did not meet patient need / wife is requesting to have a sooner appointment that is available for this one, next week if possible due to his running out for his blood pressure medicine. Original appt is July 26 and wants asap appointment.   --------------------------------------------------------------------------------------------------------------------------    Relationship to Patient: Spouse / Partner     Call Back Information: OK to leave message on voicemail  Preferred Call Back Number: Phone [phone]

## 2024-10-31 ENCOUNTER — OFFICE VISIT (OUTPATIENT)
Dept: FAMILY MEDICINE CLINIC | Age: 49
End: 2024-10-31

## 2024-10-31 VITALS
DIASTOLIC BLOOD PRESSURE: 88 MMHG | HEIGHT: 77 IN | TEMPERATURE: 97.1 F | WEIGHT: 278.4 LBS | SYSTOLIC BLOOD PRESSURE: 146 MMHG | HEART RATE: 74 BPM | RESPIRATION RATE: 16 BRPM | OXYGEN SATURATION: 98 % | BODY MASS INDEX: 32.87 KG/M2

## 2024-10-31 DIAGNOSIS — Z00.00 PREVENTATIVE HEALTH CARE: ICD-10-CM

## 2024-10-31 DIAGNOSIS — Z12.5 PROSTATE CANCER SCREENING: ICD-10-CM

## 2024-10-31 DIAGNOSIS — E78.2 MIXED HYPERLIPIDEMIA: ICD-10-CM

## 2024-10-31 DIAGNOSIS — Z12.11 COLON CANCER SCREENING: ICD-10-CM

## 2024-10-31 DIAGNOSIS — I10 ESSENTIAL HYPERTENSION: Primary | ICD-10-CM

## 2024-10-31 DIAGNOSIS — R73.9 HYPERGLYCEMIA: ICD-10-CM

## 2024-10-31 DIAGNOSIS — Z76.89 ENCOUNTER TO ESTABLISH CARE: ICD-10-CM

## 2024-10-31 RX ORDER — HYDROCHLOROTHIAZIDE 12.5 MG/1
12.5 TABLET ORAL DAILY
Qty: 90 TABLET | Refills: 3 | Status: SHIPPED | OUTPATIENT
Start: 2024-10-31

## 2024-10-31 RX ORDER — LISINOPRIL 40 MG/1
40 TABLET ORAL DAILY
Qty: 90 TABLET | Refills: 3 | Status: SHIPPED | OUTPATIENT
Start: 2024-10-31

## 2024-10-31 RX ORDER — ATORVASTATIN CALCIUM 40 MG/1
40 TABLET, FILM COATED ORAL DAILY
Qty: 90 TABLET | Refills: 3 | Status: SHIPPED | OUTPATIENT
Start: 2024-10-31

## 2024-10-31 SDOH — ECONOMIC STABILITY: INCOME INSECURITY: HOW HARD IS IT FOR YOU TO PAY FOR THE VERY BASICS LIKE FOOD, HOUSING, MEDICAL CARE, AND HEATING?: NOT HARD AT ALL

## 2024-10-31 SDOH — ECONOMIC STABILITY: FOOD INSECURITY: WITHIN THE PAST 12 MONTHS, THE FOOD YOU BOUGHT JUST DIDN'T LAST AND YOU DIDN'T HAVE MONEY TO GET MORE.: NEVER TRUE

## 2024-10-31 SDOH — ECONOMIC STABILITY: FOOD INSECURITY: WITHIN THE PAST 12 MONTHS, YOU WORRIED THAT YOUR FOOD WOULD RUN OUT BEFORE YOU GOT MONEY TO BUY MORE.: NEVER TRUE

## 2024-10-31 ASSESSMENT — ENCOUNTER SYMPTOMS
COUGH: 0
ABDOMINAL PAIN: 0
CHEST TIGHTNESS: 0
ABDOMINAL DISTENTION: 0
RHINORRHEA: 0
NAUSEA: 0
SORE THROAT: 0
VOMITING: 0
BACK PAIN: 0
SHORTNESS OF BREATH: 0
DIARRHEA: 0
CONSTIPATION: 0

## 2024-10-31 ASSESSMENT — PATIENT HEALTH QUESTIONNAIRE - PHQ9
SUM OF ALL RESPONSES TO PHQ QUESTIONS 1-9: 0
SUM OF ALL RESPONSES TO PHQ QUESTIONS 1-9: 0
2. FEELING DOWN, DEPRESSED OR HOPELESS: NOT AT ALL
1. LITTLE INTEREST OR PLEASURE IN DOING THINGS: NOT AT ALL
SUM OF ALL RESPONSES TO PHQ QUESTIONS 1-9: 0
SUM OF ALL RESPONSES TO PHQ9 QUESTIONS 1 & 2: 0
SUM OF ALL RESPONSES TO PHQ QUESTIONS 1-9: 0

## 2024-10-31 NOTE — PROGRESS NOTES
Yolanda Noyola, APRN-CNP  [facility]  [facility] MEDICINE  [address], [address]  [facility] [address]  Dept: [phone]  Dept Fax: [phone]    Patient ID: Tomi Ozuna is a 49 y.o. male.    HPI:  Tomi Ozuna is a 49 y.o. male who presents to the office today for a first visit and to establish a relationship with a new primary care provider.  Today, the patient complains of nothing.  Pt denies any fever or chills.  Pt today denies any HA, chest pain, or SOB.  Pt denies any N/V/D/C or abdominal pain. Patient does report a history of HTN (on Lisinopril 20 mg daily & HCTZ 12.5 mg daily) & HLD (on Lipitor 40 mg daily).  Patient reports compliance with all medications and is tolerating them without side effects.    Previous office notes, labs and diagnostic studies were reviewed prior to and during encounter.The patient's past medical, surgical, social, and family history as well as her current medications and allergies were reviewed as documented in today's encounter by ROSANNA Irizarry.      Current Outpatient Medications on File Prior to Visit   Medication Sig Dispense Refill    tiZANidine (ZANAFLEX) 2 MG tablet Take 1-2 tablets by mouth nightly as needed (back pain, spasms) 30 tablet 0    varenicline (CHANTIX STARTING MONTH MONY) 0.5 MG X 11 & 1 MG X 42 tablet Take by mouth. (Patient not taking: Reported on 10/31/2024) 1 box 0     No current facility-administered medications on file prior to visit.     SUBJECTIVE:     Review of Systems   Constitutional:  Negative for activity change, fatigue and fever.   HENT:  Negative for congestion, ear pain, rhinorrhea and sore throat.    Respiratory:  Negative for cough, chest tightness and shortness of breath.    Cardiovascular:  Negative for chest pain and palpitations.   Gastrointestinal:  Negative for abdominal distention, abdominal pain, constipation, diarrhea, nausea and vomiting.   Endocrine: Negative for

## 2024-11-05 ENCOUNTER — TELEPHONE (OUTPATIENT)
Dept: GASTROENTEROLOGY | Age: 49
End: 2024-11-05

## 2024-11-05 ENCOUNTER — PREP FOR PROCEDURE (OUTPATIENT)
Dept: GASTROENTEROLOGY | Age: 49
End: 2024-11-05

## 2024-11-05 DIAGNOSIS — Z12.11 COLON CANCER SCREENING: ICD-10-CM

## 2024-11-05 NOTE — TELEPHONE ENCOUNTER
Attempt 1, writer spoke with pt to schedule colonoscopy/Aziz. Pt said he is not on blood thinners. Pt needs procedure on a Monday or Friday. Pt is OK to wait on a Friday opening into the new year. Pt does have a NP appt scheduled in February with Mario; however writer told pt he does not need to be seen in office for a colon cancer screening; we can go ahead and schedule procedure.    Procedure scheduled/Dr Martin  Procedure: Colonoscopy (WQ/Screening)  Dx:  Colon cancer screening  Date: Friday 01/10/25  Time: 7:30 am/Arrive at 5:30 am  Hospital: [facility]; Surgery Entrance, back of hospital  PAT Phone Call: Friday 12/27/24 at 8:45 am  Bowel Prep instructions given: Nulytely Bowel Prep  In office/via phone: via phone  Clearance needed: N/A    Pt informed they will receive a PAT Phone Call from a [facility] PAT Nurse 1-2 weeks prior to procedure. Pt informed they must complete PAT Call or procedure may be cancelled.     Pt informed it is required they must have a /responsible adult that takes them to their procedure, stays at the hospital (before, during, and after procedure), and drives pt home. Pt informed /responsible adult must stay inside the hospital during their procedure. Pt voiced understanding of  protocol for procedure.     Pt instructed to  Rx from pharmacy within the next 7-10 days, otherwise pharmacy will put Rx back on shelf.

## 2024-11-06 RX ORDER — POLYETHYLENE GLYCOL 3350, SODIUM CHLORIDE, SODIUM BICARBONATE, POTASSIUM CHLORIDE 420; 11.2; 5.72; 1.48 G/4L; G/4L; G/4L; G/4L
POWDER, FOR SOLUTION ORAL
Qty: 1 EACH | Refills: 0 | Status: SHIPPED | OUTPATIENT
Start: 2024-11-06

## 2024-12-05 PROBLEM — Z12.11 COLON CANCER SCREENING: Status: RESOLVED | Noted: 2024-11-05 | Resolved: 2024-12-05

## 2024-12-13 PROBLEM — Z12.11 COLON CANCER SCREENING: Status: ACTIVE | Noted: 2024-11-05

## 2024-12-26 RX ORDER — LOSARTAN POTASSIUM AND HYDROCHLOROTHIAZIDE 12.5; 1 MG/1; MG/1
1 TABLET ORAL DAILY
COMMUNITY
Start: 2024-06-24

## 2024-12-26 RX ORDER — OMEPRAZOLE 40 MG/1
CAPSULE, DELAYED RELEASE ORAL
COMMUNITY

## 2024-12-27 ENCOUNTER — HOSPITAL ENCOUNTER (OUTPATIENT)
Dept: PREADMISSION TESTING | Age: 49
Discharge: HOME | End: 2024-12-31

## 2024-12-27 VITALS — WEIGHT: 265 LBS | BODY MASS INDEX: 31.29 KG/M2 | HEIGHT: 77 IN

## 2024-12-27 NOTE — PROGRESS NOTES
Pre-op Instructions For Out-Patient Endoscopy Surgery    Medication Instructions:  Please stop herbs and any supplements now (includes vitamins and minerals).    For these medications:  Dulaglutide (Trulicity), Exenatide (Byetta and Bydureon, Liraglutide (Victoza), Lixisenatide (Adlyxin), Semaglutide (Ozempic and Rybelsus), Tirzepatide (Mounjaro)- Stop 1 week prior if taking weekly or 1 day prior if taking every 12 hours or daily.     Please contact your surgeon and prescribing physician for pre-op instructions for any blood thinners.    If you have inhalers/aerosol treatments at home, please use them the morning of your surgery and bring the inhalers with you to the hospital.    Please take the following medications the morning of your surgery with a sip of water:    none    Surgery Instructions:  After midnight before surgery:  Do not eat or drink anything, including water, mints, gum, and hard candy.  You may brush your teeth without swallowing.  No smoking, chewing tobacco, or street drugs.     ** Please Follow Bowel Prep instructions if given by surgeon's office**    Please shower or bathe before surgery.       Please do not wear any cologne, lotion, powder, jewelry, piercings, perfume, makeup, nail polish, hair accessories, or hair spray on the day of surgery.  Wear loose comfortable clothing.    Leave your valuables at home but bring a payment source for any after-surgery prescriptions you plan to fill at [facility].  Bring a storage case for any glasses/contacts.    An adult who is responsible for you MUST drive you home and should be with you for the first 24 hours after surgery.     The Day of Surgery:  Arrive at [facility] Surgery Entrance at the time directed by your surgeon and check in at the desk.     If you have a living will or healthcare power of , please bring a copy.    You will be taken to the pre-op holding area where you will be prepared for surgery.  A

## 2025-01-08 NOTE — PRE-PROCEDURE INSTRUCTIONS
Have you received your Prep? Any questions with prep instructions? Y  Only Clear Liquid Diet day before.Y  Nothing to eat after midnight day before procedure.Y  Are you taking any blood thinners? If so, you need to Stop.N  Remove any jewelry and body piercings.Y  Do you wear glasses? If so, please bring a case to store them in.  Are you having any Covid symptoms?N  Do you have any new rashes, infections, etc. that we should be aware of?N  Do you have a ride home the day of surgery? It cannot be a cab or medical transportation.Y  Verify surgery time/date and what time to arrive at hospital.[id number]

## 2025-01-09 ENCOUNTER — ANESTHESIA EVENT (OUTPATIENT)
Dept: ENDOSCOPY | Age: 50
End: 2025-01-09
Payer: COMMERCIAL

## 2025-01-10 ENCOUNTER — ANESTHESIA (OUTPATIENT)
Dept: ENDOSCOPY | Age: 50
End: 2025-01-10
Payer: COMMERCIAL

## 2025-01-10 ENCOUNTER — HOSPITAL ENCOUNTER (OUTPATIENT)
Age: 50
Setting detail: OUTPATIENT SURGERY
Discharge: HOME | End: 2025-01-10
Attending: INTERNAL MEDICINE | Admitting: INTERNAL MEDICINE
Payer: COMMERCIAL

## 2025-01-10 VITALS
BODY MASS INDEX: 31.29 KG/M2 | DIASTOLIC BLOOD PRESSURE: 86 MMHG | SYSTOLIC BLOOD PRESSURE: 159 MMHG | TEMPERATURE: 97.5 F | HEIGHT: 77 IN | WEIGHT: 265 LBS | HEART RATE: 61 BPM | OXYGEN SATURATION: 96 % | RESPIRATION RATE: 20 BRPM

## 2025-01-10 DIAGNOSIS — Z12.11 COLON CANCER SCREENING: ICD-10-CM

## 2025-01-10 PROCEDURE — 6360000002 HC RX W HCPCS: Performed by: NURSE ANESTHETIST, CERTIFIED REGISTERED

## 2025-01-10 PROCEDURE — 2709999900 HC NON-CHARGEABLE SUPPLY: Performed by: INTERNAL MEDICINE

## 2025-01-10 PROCEDURE — 3700000000 HC ANESTHESIA ATTENDED CARE: Performed by: INTERNAL MEDICINE

## 2025-01-10 PROCEDURE — 45380 COLONOSCOPY AND BIOPSY: CPT | Performed by: INTERNAL MEDICINE

## 2025-01-10 PROCEDURE — 88305 TISSUE EXAM BY PATHOLOGIST: CPT

## 2025-01-10 PROCEDURE — 2580000003 HC RX 258: Performed by: ANESTHESIOLOGY

## 2025-01-10 PROCEDURE — 7100000010 HC PHASE II RECOVERY - FIRST 15 MIN: Performed by: INTERNAL MEDICINE

## 2025-01-10 PROCEDURE — 7100000011 HC PHASE II RECOVERY - ADDTL 15 MIN: Performed by: INTERNAL MEDICINE

## 2025-01-10 PROCEDURE — 2500000003 HC RX 250 WO HCPCS: Performed by: NURSE ANESTHETIST, CERTIFIED REGISTERED

## 2025-01-10 PROCEDURE — 45385 COLONOSCOPY W/LESION REMOVAL: CPT | Performed by: INTERNAL MEDICINE

## 2025-01-10 PROCEDURE — 3700000001 HC ADD 15 MINUTES (ANESTHESIA): Performed by: INTERNAL MEDICINE

## 2025-01-10 PROCEDURE — 3609010600 HC COLONOSCOPY POLYPECTOMY SNARE/COLD BIOPSY: Performed by: INTERNAL MEDICINE

## 2025-01-10 RX ORDER — KETAMINE HYDROCHLORIDE 50 MG/ML
INJECTION, SOLUTION INTRAMUSCULAR; INTRAVENOUS
Status: DISCONTINUED | OUTPATIENT
Start: 2025-01-10 | End: 2025-01-10 | Stop reason: SDUPTHER

## 2025-01-10 RX ORDER — PROPOFOL 10 MG/ML
INJECTION, EMULSION INTRAVENOUS
Status: DISCONTINUED | OUTPATIENT
Start: 2025-01-10 | End: 2025-01-10 | Stop reason: SDUPTHER

## 2025-01-10 RX ORDER — SODIUM CHLORIDE 9 MG/ML
INJECTION, SOLUTION INTRAVENOUS CONTINUOUS
Status: DISCONTINUED | OUTPATIENT
Start: 2025-01-10 | End: 2025-01-10 | Stop reason: HOSPADM

## 2025-01-10 RX ORDER — SODIUM CHLORIDE 9 MG/ML
INJECTION, SOLUTION INTRAVENOUS PRN
Status: DISCONTINUED | OUTPATIENT
Start: 2025-01-10 | End: 2025-01-10 | Stop reason: HOSPADM

## 2025-01-10 RX ORDER — LIDOCAINE HYDROCHLORIDE 10 MG/ML
1 INJECTION, SOLUTION EPIDURAL; INFILTRATION; INTRACAUDAL; PERINEURAL
Status: DISCONTINUED | OUTPATIENT
Start: 2025-01-10 | End: 2025-01-10 | Stop reason: HOSPADM

## 2025-01-10 RX ORDER — SODIUM CHLORIDE 0.9 % (FLUSH) 0.9 %
5-40 SYRINGE (ML) INJECTION PRN
Status: DISCONTINUED | OUTPATIENT
Start: 2025-01-10 | End: 2025-01-10 | Stop reason: HOSPADM

## 2025-01-10 RX ORDER — SODIUM CHLORIDE 0.9 % (FLUSH) 0.9 %
5-40 SYRINGE (ML) INJECTION EVERY 12 HOURS SCHEDULED
Status: DISCONTINUED | OUTPATIENT
Start: 2025-01-10 | End: 2025-01-10 | Stop reason: HOSPADM

## 2025-01-10 RX ADMIN — PROPOFOL 50 MG: 10 INJECTION, EMULSION INTRAVENOUS at 07:50

## 2025-01-10 RX ADMIN — PROPOFOL 100 MG: 10 INJECTION, EMULSION INTRAVENOUS at 07:34

## 2025-01-10 RX ADMIN — PROPOFOL 50 MG: 10 INJECTION, EMULSION INTRAVENOUS at 07:38

## 2025-01-10 RX ADMIN — KETAMINE HYDROCHLORIDE 20 MG: 50 INJECTION, SOLUTION INTRAMUSCULAR; INTRAVENOUS at 07:34

## 2025-01-10 RX ADMIN — PROPOFOL 50 MG: 10 INJECTION, EMULSION INTRAVENOUS at 07:57

## 2025-01-10 RX ADMIN — PROPOFOL 50 MG: 10 INJECTION, EMULSION INTRAVENOUS at 07:40

## 2025-01-10 RX ADMIN — PROPOFOL 50 MG: 10 INJECTION, EMULSION INTRAVENOUS at 07:42

## 2025-01-10 RX ADMIN — PROPOFOL 50 MG: 10 INJECTION, EMULSION INTRAVENOUS at 07:46

## 2025-01-10 RX ADMIN — PROPOFOL 50 MG: 10 INJECTION, EMULSION INTRAVENOUS at 07:48

## 2025-01-10 RX ADMIN — PROPOFOL 50 MG: 10 INJECTION, EMULSION INTRAVENOUS at 07:54

## 2025-01-10 RX ADMIN — SODIUM CHLORIDE: 9 INJECTION, SOLUTION INTRAVENOUS at 06:38

## 2025-01-10 ASSESSMENT — ENCOUNTER SYMPTOMS
RESPIRATORY NEGATIVE: 1
GASTROINTESTINAL NEGATIVE: 1

## 2025-01-10 ASSESSMENT — PAIN - FUNCTIONAL ASSESSMENT
PAIN_FUNCTIONAL_ASSESSMENT: NONE - DENIES PAIN
PAIN_FUNCTIONAL_ASSESSMENT: NONE - DENIES PAIN

## 2025-01-10 NOTE — H&P
HISTORY and PHYSICAL  [facility]       NAME:  Tomi Oznua  MRN: [id number]   YOB: 1975   Date: 1/10/2025   Age: 49 y.o.  Gender: male       COMPLAINT AND PRESENT HISTORY:     Tomi Ozuna is 49 y.o.,  male, presents for COLONOSCOPY SCREENING     Primary dx: Colon cancer screening [Z12.11].  HPI:  Tomi Ozuna is 49 y.o.,   male, having a Screening Colonoscopy. This is patient's first Colonoscopy.   Patient denies any  FH of Colon.  Patient denies any GI symptoms. No nausea / vomiting.  No diarrhea or constipation. No abdominal pains or cramping.  Pt has hx of GERD and he is talking Prilosec with good help. He denies dysphagia.  no changes in the color, caliber or consistency of the stools. No fever or chills, chest pain or SOB   Prep fully completed: yes . Pt reports his last BM is clear liquid       Review of additional significant medical hx:  (See chart for additional detail, including current medications /see ROS for current S/S):  HTN, HLD managed with taking: HYZAAR, LISINOPRIL , HYDROCHLOROTHIAZIDE, LIPITOR.       NPO status: pt Npo since the past midnight   Medications taken TODAY (with sip of water): none  Anticoagulation status: none  Denies personal hx of blood clots.  Denies personal hx of MRSA infection.  Denies any personal or family hx of previous complications w/anesthesia.    PAST MEDICAL HISTORY     Past Medical History:   Diagnosis Date    GERD (gastroesophageal reflux disease)     Hyperlipidemia     Hypertension     Pain     lower back    Wears glasses        SURGICAL HISTORY       Past Surgical History:   Procedure Laterality Date    LUMBAR SPINE SURGERY N/A 5/8/2019    LUMBAR  DISCECTOMY POSTERIOR L3 TO L4 performed by Roge Brandon MD at [facility] OR    MRI UPPER EXTREMITY W OR WO CONTRAST  04/19/2019    lumbar spine without contrast       FAMILY HISTORY       Family History   Problem Relation Age of Onset    Cancer Mother     High Blood Pressure Father      tiZANidine (ZANAFLEX) 2 MG tablet Take 1-2 tablets by mouth nightly as needed (back pain, spasms) 30 tablet 0       Review of Systems   Constitutional: Negative.    HENT: Negative.     Respiratory: Negative.     Cardiovascular: Negative.    Gastrointestinal: Negative.    Genitourinary: Negative.    Musculoskeletal: Negative.    Skin: Negative.    Neurological: Negative.    Hematological: Negative.    Psychiatric/Behavioral: Negative.           GENERAL PHYSICAL EXAM     Vitals: see nursing flow sheet for vital sings     GENERAL APPEARANCE:   Tomi Ozuna is 49 y.o.,  male, mildly obese, nourished, conscious, alert.  Does not appear to be distress or pain at this time.                            Physical Exam  Constitutional:       General: He is not in acute distress.     Appearance: Normal appearance. He is obese. He is not ill-appearing.   HENT:      Head: Normocephalic.      Right Ear: External ear normal.      Left Ear: External ear normal.      Nose: Nose normal.      Mouth/Throat:      Mouth: Mucous membranes are dry.   Eyes:      General:         Right eye: No discharge.         Left eye: No discharge.   Cardiovascular:      Rate and Rhythm: Normal rate and regular rhythm.      Pulses: Normal pulses.      Heart sounds: Normal heart sounds. No murmur heard.  Pulmonary:      Effort: Pulmonary effort is normal.      Breath sounds: Normal breath sounds. No wheezing or rhonchi.   Abdominal:      General: Bowel sounds are normal. There is no distension.      Palpations: Abdomen is soft. There is no mass.      Tenderness: There is no abdominal tenderness.   Musculoskeletal:         General: No swelling or tenderness. Normal range of motion.      Cervical back: Normal range of motion and neck supple.      Right lower leg: No edema.      Left lower leg: No edema.   Skin:     General: Skin is warm and dry.      Findings: No bruising or erythema.   Neurological:      General: No focal deficit present.      Mental

## 2025-01-10 NOTE — DISCHARGE INSTRUCTIONS
Colonoscopy: What to Expect at Home  Your Recovery  After you have a colonoscopy, you will stay at the clinic for 1 to 2 hours until the medicines wear off. Then you can go home, but you will need to arrange for a ride. Your doctor will tell you when you can eat and do your other usual activities.  Your doctor will talk to you about when you will need your next colonoscopy. The results of your test and your risk for colorectal cancer will help your doctor decide how often you need to be checked.  After the test, you may be bloated or have gas pains. You may need to pass gas. If a biopsy was done or a polyp was removed, you may have streaks of blood in your stool (feces) for a few days.  This care sheet gives you a general idea about how long it will take for you to recover. But each person recovers at a different pace. Follow the steps below to get better as quickly as possible.  How can you care for yourself at home?  Activity  Rest as much as you need to after you go home.  You should be able to go back to your usual activities the day after the test.  Diet  Follow your doctor’s directions for eating.  Drink plenty of fluids (unless your doctor has told you not to) to replace the fluids that were lost during the colon prep.  Do not drink alcohol.  Medicines  If polyps were removed or a biopsy was done during the test, your doctor may tell you not to take aspirin or other anti-inflammatory medicines, such as ibuprofen (Advil, Motrin) and naproxen (Aleve), for a few days.  Other instructions  For your safety, you should not drive or operate machinery until the medicine effects are gone and you can think clearly. Your doctor may tell you not to drive or operate machinery until the day after your test.  Do not sign legal documents or make major decisions until the medicine effects are gone and you can think clearly. The anesthesia medicine can make it hard for you to fully understand what you are agreeing    Fruit Nuts Popcorn, whole-wheat pretzels, or trail mix made with dried fruits, nuts, and seeds Cakes, breads, and cookies made with oatmeal or whole-wheat flour   Most snack foods do not provide much fiber. Choose snacks with at least 2 grams of fiber per serving.     Last Reviewed: March 2011 Trena Campbell MS, MPH, RD   Updated: 3/29/2011

## 2025-01-10 NOTE — OP NOTE
Colonoscopy report    Colonoscopy Procedure Note    Procedure:  Colonoscopy with polypectomy with snare and biopsy forceps    Procedure Date: 1/10/2025    Indications: Colon cancer screening, average risk    Sedation:  MAC    Attending Physician:  Dr. Chalino Martin MD.     Assistant Physician: None    Consent:   Informed consent was obtained for the procedure after explaining the risks including bleeding, perforation, aspiration, arrhythmia, risks related to sedation, reaction to medications and rarely death, benefits and alternatives to the patient. The patient verbalized understanding and agreed to proceed with the procedure.       Procedure Details:  The patient was placed in the left lateral decubitus position.  Oxygen and cardiac monitoring equipment was attached. The patient's vital signs were monitored continuously  throughout the procedure. After appropriate sedation was achieved, a rectal examination was performed.  The pediatric colonoscopy was inserted into the rectum and advanced under direct vision to the cecum which was identified by ileocecal valve and appendiceal orifice.  The quality of the colonic preparation was good to fair.  A careful inspection was made as the colonoscope was withdrawn, including a retroflexed view of the rectum; findings and interventions are described below.  Appropriate photodocumentation was obtained.           Complications:  None    Estimated blood loss:  Minimal           Disposition:  Home           Condition: stable    Withdrawal Time: 20 minutes    Findings:   The bowel prep was good to fair (cecum).  The advancement of scope to cecum was challenging due to looping of scope and colonic redundancy.  Successful intubation to cecum achieved with abdominal pressure and loop reduction.  A 2 mm polyp noted in the cecum resected with cold biopsy forceps.  4 mm polyp noted in the transverse colon resected with cold snare.  3 polyps noted in the sigmoid measuring 2 to 6 mm,  resected with a combination of cold biopsy forceps and cold snare.  Small scattered diverticula noted in the sigmoid colon.  Perianal examination and retroflexion examination in the rectum with moderate external and internal hemorrhoids.    Specimen Removed: Colon polyps    Endoscopic Impression:    Fair to good bowel prep.  Colonic redundancy.  5 polyps (1X cecum, 1X transverse colon, 3X sigmoid colon), size ranging 2 to 6 mm, resected with combination cold snare and cold biopsy forceps.  Moderate internal/external hemorrhoids    Recommendations:  Follow pathology results.  Repeat colonoscopy for screening/surveillance based on pathology likely in 3 to 5 years.    Attending Attestation: I performed the procedure.    Chalino Martin MD

## 2025-01-10 NOTE — ANESTHESIA PRE PROCEDURE
Department of Anesthesiology  Preprocedure Note       Name:  Tomi Ozuna   Age:  49 y.o.  :  1975                                          MRN:  [id number]         Date:  1/10/2025      Surgeon: Surgeon(s):  Chalino Martin MD    Procedure: Procedure(s):  COLONOSCOPY SCREENING    Medications prior to admission:   Prior to Admission medications    Medication Sig Start Date End Date Taking? Authorizing Provider   losartan-hydroCHLOROthiazide (HYZAAR) 100-12.5 MG per tablet Take 1 tablet by mouth daily 24  Yes Mali Green MD   omeprazole (PRILOSEC) 40 MG delayed release capsule TAKE 1 CAPSULE (40 MG TOTAL) BY MOUTH EVERY MORNING BEFORE BREAKFAST FOR 30 DAYS.   Yes ProviderMali MD   polyethylene glycol-electrolytes (NULYTELY) 420 g solution Take as directed for bowel prep. 24  Yes Chalino Martin MD   lisinopril (PRINIVIL;ZESTRIL) 40 MG tablet Take 1 tablet by mouth daily 10/31/24  Yes Yolanda Noyola APRN - NP   hydroCHLOROthiazide 12.5 MG tablet Take 1 tablet by mouth daily 10/31/24  Yes Yolanda Noyola APRN - NP   atorvastatin (LIPITOR) 40 MG tablet Take 1 tablet by mouth daily 10/31/24  Yes Yolanda Noyola APRN - NP   tiZANidine (ZANAFLEX) 2 MG tablet Take 1-2 tablets by mouth nightly as needed (back pain, spasms) 2/10/21  Yes Juan C Muller APRN - CNP   varenicline (CHANTIX STARTING MONTH ) 0.5 MG X 11 & 1 MG X 42 tablet Take by mouth.  Patient not taking: Reported on 10/31/2024 2/10/21   Juan C Muller APRN - CNP       Current medications:    Current Facility-Administered Medications   Medication Dose Route Frequency Provider Last Rate Last Admin   • lidocaine PF 1 % injection 1 mL  1 mL IntraDERmal Once PRN Clarisse Zacarias MD       • sodium chloride flush 0.9 % injection 5-40 mL  5-40 mL IntraVENous 2 times per day Clarisse Zacarias MD       • sodium chloride flush 0.9 % injection 5-40 mL  5-40 mL IntraVENous PRN Clarisse Zacarias MD       • 0.9 % sodium chloride

## 2025-01-12 PROBLEM — Z12.11 COLON CANCER SCREENING: Status: RESOLVED | Noted: 2024-11-05 | Resolved: 2025-01-12

## 2025-01-13 LAB — SURGICAL PATHOLOGY REPORT: NORMAL

## 2025-04-28 ENCOUNTER — OFFICE VISIT (OUTPATIENT)
Dept: FAMILY MEDICINE CLINIC | Age: 50
End: 2025-04-28
Payer: COMMERCIAL

## 2025-04-28 VITALS
OXYGEN SATURATION: 97 % | SYSTOLIC BLOOD PRESSURE: 136 MMHG | HEIGHT: 77 IN | BODY MASS INDEX: 31.05 KG/M2 | DIASTOLIC BLOOD PRESSURE: 76 MMHG | WEIGHT: 263 LBS | HEART RATE: 69 BPM | RESPIRATION RATE: 16 BRPM | TEMPERATURE: 97.2 F

## 2025-04-28 DIAGNOSIS — Z12.5 PROSTATE CANCER SCREENING: ICD-10-CM

## 2025-04-28 DIAGNOSIS — Z79.899 ON STATIN THERAPY: ICD-10-CM

## 2025-04-28 DIAGNOSIS — I10 ESSENTIAL HYPERTENSION: Primary | ICD-10-CM

## 2025-04-28 DIAGNOSIS — K21.9 GASTROESOPHAGEAL REFLUX DISEASE WITHOUT ESOPHAGITIS: ICD-10-CM

## 2025-04-28 DIAGNOSIS — E78.2 MIXED HYPERLIPIDEMIA: ICD-10-CM

## 2025-04-28 PROCEDURE — 3078F DIAST BP <80 MM HG: CPT | Performed by: NURSE PRACTITIONER

## 2025-04-28 PROCEDURE — 99214 OFFICE O/P EST MOD 30 MIN: CPT | Performed by: NURSE PRACTITIONER

## 2025-04-28 PROCEDURE — 3075F SYST BP GE 130 - 139MM HG: CPT | Performed by: NURSE PRACTITIONER

## 2025-04-28 RX ORDER — OMEPRAZOLE 40 MG/1
40 CAPSULE, DELAYED RELEASE ORAL DAILY
Qty: 90 CAPSULE | Refills: 3 | Status: SHIPPED | OUTPATIENT
Start: 2025-04-28

## 2025-04-28 SDOH — ECONOMIC STABILITY: FOOD INSECURITY: WITHIN THE PAST 12 MONTHS, THE FOOD YOU BOUGHT JUST DIDN'T LAST AND YOU DIDN'T HAVE MONEY TO GET MORE.: NEVER TRUE

## 2025-04-28 SDOH — ECONOMIC STABILITY: FOOD INSECURITY: WITHIN THE PAST 12 MONTHS, YOU WORRIED THAT YOUR FOOD WOULD RUN OUT BEFORE YOU GOT MONEY TO BUY MORE.: NEVER TRUE

## 2025-04-28 ASSESSMENT — ENCOUNTER SYMPTOMS
ABDOMINAL PAIN: 0
BACK PAIN: 0
NAUSEA: 0
COUGH: 0
RHINORRHEA: 0
DIARRHEA: 0
ABDOMINAL DISTENTION: 0
VOMITING: 0
SHORTNESS OF BREATH: 0
CONSTIPATION: 0
SORE THROAT: 0
CHEST TIGHTNESS: 0

## 2025-04-28 ASSESSMENT — PATIENT HEALTH QUESTIONNAIRE - PHQ9
SUM OF ALL RESPONSES TO PHQ QUESTIONS 1-9: 0
SUM OF ALL RESPONSES TO PHQ QUESTIONS 1-9: 0
1. LITTLE INTEREST OR PLEASURE IN DOING THINGS: NOT AT ALL
SUM OF ALL RESPONSES TO PHQ QUESTIONS 1-9: 0
SUM OF ALL RESPONSES TO PHQ QUESTIONS 1-9: 0
2. FEELING DOWN, DEPRESSED OR HOPELESS: NOT AT ALL

## 2025-04-28 NOTE — PROGRESS NOTES
Yolanda Noyola, APRN-CNP  [facility]  [facility]  [id number], [address]  [place] [address]  Dept: [phone]  Dept Fax: [phone]    Patient ID: Tomi Ozuna is a 50 y.o. male.    HPI:  Established pt here today for f/u on chronic medical problems; HTN, HLD, go over labs and/or diagnostic studies, and medication refills.  Pt denies any fever or chills.  Pt today denies any HA, chest pain, or SOB.  Pt denies any N/V/D/C or abdominal pain. Today, patient denies complaints.  Patient is taking medications as prescribed and is tolerating them well without significant side effects. Patient is doing well on current treatment.      Previous office notes, labs and diagnostic studies were reviewed prior to and during encounter.The patient's past medical, surgical, social, and family history as well as her current medications and allergies were reviewed as documented in today's encounter by ROSANNA Irizarry.      Current Outpatient Medications on File Prior to Visit   Medication Sig Dispense Refill    lisinopril (PRINIVIL;ZESTRIL) 40 MG tablet Take 1 tablet by mouth daily 90 tablet 3    hydroCHLOROthiazide 12.5 MG tablet Take 1 tablet by mouth daily 90 tablet 3    atorvastatin (LIPITOR) 40 MG tablet Take 1 tablet by mouth daily 90 tablet 3    tiZANidine (ZANAFLEX) 2 MG tablet Take 1-2 tablets by mouth nightly as needed (back pain, spasms) 30 tablet 0     No current facility-administered medications on file prior to visit.     SUBJECTIVE:     Review of Systems   Constitutional:  Negative for activity change, fatigue and fever.   HENT:  Negative for congestion, ear pain, rhinorrhea and sore throat.    Respiratory:  Negative for cough, chest tightness and shortness of breath.    Cardiovascular:  Negative for chest pain and palpitations.   Gastrointestinal:  Negative for abdominal distention, abdominal pain, constipation, diarrhea, nausea and vomiting.   Endocrine:

## 2025-07-16 ENCOUNTER — TELEPHONE (OUTPATIENT)
Dept: FAMILY MEDICINE CLINIC | Age: 50
End: 2025-07-16

## 2025-07-16 RX ORDER — LOSARTAN POTASSIUM 50 MG/1
50 TABLET ORAL DAILY
Qty: 90 TABLET | Refills: 1 | Status: SHIPPED | OUTPATIENT
Start: 2025-07-16

## 2025-07-16 NOTE — TELEPHONE ENCOUNTER
Called and spoke with pt. Pt stated that he was seen at the Urgent Care in [place]. Pt states that he just took the Benadryl about 20  minutes ago so he hasn't really noticed if his symptoms have improved or not. Advised pt that PCP is recommending stopping the Lisinopril. Pt stated understanding and is also wondering if he should go back down to the lower dose since his symptoms did not start until he started the 40 mg dose.

## 2025-07-16 NOTE — TELEPHONE ENCOUNTER
No, stay off of the Lisinopril all together. I will add it to his allergy list.  I will send in another medication for his blood pressure. It will be Losartan 50 mg daily.  Have him schedule a follow up with me in a month. Continue the Benadryl. If symptoms worsen, go to the ER.

## 2025-07-16 NOTE — TELEPHONE ENCOUNTER
Patient believes that the lisinopril medication is causing him to have an allergic reaction. Symptoms did not start until the medication dosage was upped.     Symptoms:    - lips/tongue/cheeks are swollen     Patient did go the Urgent Care and was advised to stop the medication and call his PCP. Patient was given a steroid and benadryl at the Urgent Care.     Please advise.

## 2025-07-17 NOTE — TELEPHONE ENCOUNTER
Called and spoke with pt in regards to medication change. Pt stated understanding and will call back to schedule a 1 month follow up.     Writer did ask pt how his symptoms are today, and pt stated that they are better.

## (undated) DEVICE — FORCEPS BX L240CM WRK CHN 2.8MM STD CAP W/ NDL MIC MESH

## (undated) DEVICE — 5.0MM X-COARSE DIAMOND

## (undated) DEVICE — ENDO KIT W/SYRINGE: Brand: MEDLINE INDUSTRIES, INC.

## (undated) DEVICE — COVER LT HNDL BLU PLAS

## (undated) DEVICE — DEFENDO AIR WATER SUCTION AND BIOPSY VALVE KIT FOR  OLYMPUS: Brand: DEFENDO AIR/WATER/SUCTION AND BIOPSY VALVE

## (undated) DEVICE — GLOVE SURG SZ 8 L12IN FNGR THK13MIL BRN LTX SYN POLYMER W

## (undated) DEVICE — SNARE ENDOSCP POLYP MED STD AD 2.4X27X240 CM 2.8 MM OVL SENS

## (undated) DEVICE — Device

## (undated) DEVICE — BANDAGE,GAUZE,BULKEE II,4.5"X4.1YD,STRL: Brand: MEDLINE

## (undated) DEVICE — POLYP TRAP: Brand: TRAPEASE®

## (undated) DEVICE — SINGLE PORT MANIFOLD: Brand: NEPTUNE 2

## (undated) DEVICE — SUTURE VCRL + SZ 3-0 L27IN ABSRB WHT FS-1 3/8 CIR REV CUT VCP442H

## (undated) DEVICE — 3M™ WARMING BLANKET, LOWER BODY, 10 PER CASE, 42568: Brand: BAIR HUGGER™

## (undated) DEVICE — GLOVE SURG SZ 8 L12IN FNGR THK79MIL GRN LTX FREE

## (undated) DEVICE — Z INACTIVE USE 2392665 BLADE LARYNGOSCOPE 4 GLIDESCOPE GVL STAT DISP

## (undated) DEVICE — SUTURE VCRL + SZ 2 L27IN ABSRB UD L40MM CP 1/2 CIR REV CUT VCP195H